# Patient Record
Sex: MALE | Race: WHITE | NOT HISPANIC OR LATINO | URBAN - METROPOLITAN AREA
[De-identification: names, ages, dates, MRNs, and addresses within clinical notes are randomized per-mention and may not be internally consistent; named-entity substitution may affect disease eponyms.]

---

## 2019-09-08 ENCOUNTER — EMERGENCY (EMERGENCY)
Facility: HOSPITAL | Age: 69
LOS: 1 days | Discharge: HOME | End: 2019-09-08
Attending: EMERGENCY MEDICINE | Admitting: EMERGENCY MEDICINE
Payer: MEDICARE

## 2019-09-08 VITALS
DIASTOLIC BLOOD PRESSURE: 65 MMHG | TEMPERATURE: 97 F | OXYGEN SATURATION: 100 % | RESPIRATION RATE: 16 BRPM | SYSTOLIC BLOOD PRESSURE: 140 MMHG | HEART RATE: 71 BPM

## 2019-09-08 VITALS
TEMPERATURE: 98 F | HEART RATE: 79 BPM | OXYGEN SATURATION: 100 % | DIASTOLIC BLOOD PRESSURE: 69 MMHG | RESPIRATION RATE: 16 BRPM | SYSTOLIC BLOOD PRESSURE: 138 MMHG

## 2019-09-08 LAB
ALBUMIN SERPL ELPH-MCNC: 4 G/DL — SIGNIFICANT CHANGE UP (ref 3.3–5)
ALP SERPL-CCNC: 57 U/L — SIGNIFICANT CHANGE UP (ref 40–120)
ALT FLD-CCNC: 20 U/L — SIGNIFICANT CHANGE UP (ref 4–41)
AMPHET UR-MCNC: NEGATIVE — SIGNIFICANT CHANGE UP
ANION GAP SERPL CALC-SCNC: 18 MMO/L — HIGH (ref 7–14)
APAP SERPL-MCNC: < 15 UG/ML — LOW (ref 15–25)
APPEARANCE UR: SIGNIFICANT CHANGE UP
AST SERPL-CCNC: 27 U/L — SIGNIFICANT CHANGE UP (ref 4–40)
BACTERIA # UR AUTO: SIGNIFICANT CHANGE UP
BARBITURATES UR SCN-MCNC: NEGATIVE — SIGNIFICANT CHANGE UP
BASOPHILS # BLD AUTO: 0.05 K/UL — SIGNIFICANT CHANGE UP (ref 0–0.2)
BASOPHILS NFR BLD AUTO: 1 % — SIGNIFICANT CHANGE UP (ref 0–2)
BENZODIAZ UR-MCNC: NEGATIVE — SIGNIFICANT CHANGE UP
BILIRUB SERPL-MCNC: 0.7 MG/DL — SIGNIFICANT CHANGE UP (ref 0.2–1.2)
BILIRUB UR-MCNC: NEGATIVE — SIGNIFICANT CHANGE UP
BLOOD UR QL VISUAL: SIGNIFICANT CHANGE UP
BUN SERPL-MCNC: 23 MG/DL — SIGNIFICANT CHANGE UP (ref 7–23)
CALCIUM SERPL-MCNC: 9.5 MG/DL — SIGNIFICANT CHANGE UP (ref 8.4–10.5)
CANNABINOIDS UR-MCNC: NEGATIVE — SIGNIFICANT CHANGE UP
CHLORIDE SERPL-SCNC: 109 MMOL/L — HIGH (ref 98–107)
CO2 SERPL-SCNC: 18 MMOL/L — LOW (ref 22–31)
COCAINE METAB.OTHER UR-MCNC: NEGATIVE — SIGNIFICANT CHANGE UP
COLOR SPEC: SIGNIFICANT CHANGE UP
CREAT SERPL-MCNC: 1.53 MG/DL — HIGH (ref 0.5–1.3)
EOSINOPHIL # BLD AUTO: 0.14 K/UL — SIGNIFICANT CHANGE UP (ref 0–0.5)
EOSINOPHIL NFR BLD AUTO: 2.7 % — SIGNIFICANT CHANGE UP (ref 0–6)
EPI CELLS # UR: SIGNIFICANT CHANGE UP
ETHANOL BLD-MCNC: < 10 MG/DL — SIGNIFICANT CHANGE UP
GLUCOSE SERPL-MCNC: 119 MG/DL — HIGH (ref 70–99)
GLUCOSE UR-MCNC: NEGATIVE — SIGNIFICANT CHANGE UP
HBA1C BLD-MCNC: 6.5 % — HIGH (ref 4–5.6)
HCT VFR BLD CALC: 32.6 % — LOW (ref 39–50)
HGB BLD-MCNC: 10.2 G/DL — LOW (ref 13–17)
IMM GRANULOCYTES NFR BLD AUTO: 0.6 % — SIGNIFICANT CHANGE UP (ref 0–1.5)
KETONES UR-MCNC: 10 — SIGNIFICANT CHANGE UP
LEUKOCYTE ESTERASE UR-ACNC: HIGH
LYMPHOCYTES # BLD AUTO: 1.01 K/UL — SIGNIFICANT CHANGE UP (ref 1–3.3)
LYMPHOCYTES # BLD AUTO: 19.6 % — SIGNIFICANT CHANGE UP (ref 13–44)
MCHC RBC-ENTMCNC: 28.1 PG — SIGNIFICANT CHANGE UP (ref 27–34)
MCHC RBC-ENTMCNC: 31.3 % — LOW (ref 32–36)
MCV RBC AUTO: 89.8 FL — SIGNIFICANT CHANGE UP (ref 80–100)
METHADONE UR-MCNC: NEGATIVE — SIGNIFICANT CHANGE UP
MONOCYTES # BLD AUTO: 0.67 K/UL — SIGNIFICANT CHANGE UP (ref 0–0.9)
MONOCYTES NFR BLD AUTO: 13 % — SIGNIFICANT CHANGE UP (ref 2–14)
NEUTROPHILS # BLD AUTO: 3.25 K/UL — SIGNIFICANT CHANGE UP (ref 1.8–7.4)
NEUTROPHILS NFR BLD AUTO: 63.1 % — SIGNIFICANT CHANGE UP (ref 43–77)
NITRITE UR-MCNC: NEGATIVE — SIGNIFICANT CHANGE UP
NRBC # FLD: 0 K/UL — SIGNIFICANT CHANGE UP (ref 0–0)
OPIATES UR-MCNC: NEGATIVE — SIGNIFICANT CHANGE UP
OXYCODONE UR-MCNC: NEGATIVE — SIGNIFICANT CHANGE UP
PCP UR-MCNC: NEGATIVE — SIGNIFICANT CHANGE UP
PH UR: 6.5 — SIGNIFICANT CHANGE UP (ref 5–8)
PLATELET # BLD AUTO: 208 K/UL — SIGNIFICANT CHANGE UP (ref 150–400)
PMV BLD: 9.9 FL — SIGNIFICANT CHANGE UP (ref 7–13)
POTASSIUM SERPL-MCNC: 4 MMOL/L — SIGNIFICANT CHANGE UP (ref 3.5–5.3)
POTASSIUM SERPL-SCNC: 4 MMOL/L — SIGNIFICANT CHANGE UP (ref 3.5–5.3)
PROT SERPL-MCNC: 6.7 G/DL — SIGNIFICANT CHANGE UP (ref 6–8.3)
PROT UR-MCNC: 200 — HIGH
RBC # BLD: 3.63 M/UL — LOW (ref 4.2–5.8)
RBC # FLD: 14.6 % — HIGH (ref 10.3–14.5)
RBC CASTS # UR COMP ASSIST: >50 — HIGH (ref 0–?)
SALICYLATES SERPL-MCNC: < 5 MG/DL — LOW (ref 15–30)
SODIUM SERPL-SCNC: 145 MMOL/L — SIGNIFICANT CHANGE UP (ref 135–145)
SP GR SPEC: 1.02 — SIGNIFICANT CHANGE UP (ref 1–1.04)
T3 SERPL-MCNC: 81.2 NG/DL — SIGNIFICANT CHANGE UP (ref 80–200)
T4 AB SER-ACNC: 6.94 UG/DL — SIGNIFICANT CHANGE UP (ref 5.1–13)
TSH SERPL-MCNC: 1.84 UIU/ML — SIGNIFICANT CHANGE UP (ref 0.27–4.2)
UROBILINOGEN FLD QL: NORMAL — SIGNIFICANT CHANGE UP
WBC # BLD: 5.15 K/UL — SIGNIFICANT CHANGE UP (ref 3.8–10.5)
WBC # FLD AUTO: 5.15 K/UL — SIGNIFICANT CHANGE UP (ref 3.8–10.5)
WBC UR QL: HIGH (ref 0–?)

## 2019-09-08 PROCEDURE — 99284 EMERGENCY DEPT VISIT MOD MDM: CPT

## 2019-09-08 RX ORDER — CEFTRIAXONE 500 MG/1
1000 INJECTION, POWDER, FOR SOLUTION INTRAMUSCULAR; INTRAVENOUS ONCE
Refills: 0 | Status: COMPLETED | OUTPATIENT
Start: 2019-09-08 | End: 2019-09-08

## 2019-09-08 RX ORDER — SODIUM CHLORIDE 9 MG/ML
1000 INJECTION INTRAMUSCULAR; INTRAVENOUS; SUBCUTANEOUS ONCE
Refills: 0 | Status: COMPLETED | OUTPATIENT
Start: 2019-09-08 | End: 2019-09-08

## 2019-09-08 RX ORDER — CEFTRIAXONE 500 MG/1
500 INJECTION, POWDER, FOR SOLUTION INTRAMUSCULAR; INTRAVENOUS ONCE
Refills: 0 | Status: DISCONTINUED | OUTPATIENT
Start: 2019-09-08 | End: 2019-09-08

## 2019-09-08 RX ORDER — CEPHALEXIN 500 MG
1 CAPSULE ORAL
Qty: 14 | Refills: 0
Start: 2019-09-08 | End: 2019-09-14

## 2019-09-08 RX ADMIN — SODIUM CHLORIDE 1000 MILLILITER(S): 9 INJECTION INTRAMUSCULAR; INTRAVENOUS; SUBCUTANEOUS at 11:27

## 2019-09-08 RX ADMIN — CEFTRIAXONE 100 MILLIGRAM(S): 500 INJECTION, POWDER, FOR SOLUTION INTRAMUSCULAR; INTRAVENOUS at 11:27

## 2019-09-08 NOTE — ED ADULT NURSE NOTE - OBJECTIVE STATEMENT
Received pt in room 16. Pt A&OX3, ambulatory. Pt brought in by EMS by [facility]. Pt reports feeling depressed for a long time since his wife passed away in 1984, worsening. Pt reports feeling depressed and having an "episode", pt states "I went for a walk barefoot and knew I was in trouble and felt disoriented, and needed emotional help so I went into [address] and they called 911". Pt w hx. HTN, HLD, BPH, CAD.  Denies any psych hx. Pt denies SI Hi hearing/Visual Hallucination Denies Alcohol/drug used. Pt appears stable at this time, pt tearful when spoken to about feeling depressed but otherwise in good spirits. Pt w leg beg to right thigh. As per pt uses leg bag due to BPH. Pt draining urine. Pt denies any other medical complaints. denies pain, sob, n/v/d, fever, cough, dizziness, headache. Respirations are equal and nonlabored, no respiratory distress noted. Pt stable at this time. awaiting further plan Received pt in room 16. Pt A&OX3, ambulatory. Pt brought in by EMS by [facility]. Pt reports feeling depressed for a long time since his wife passed away in 1984, worsening. Pt reports feeling depressed and having an "episode", pt states "I went for a walk barefoot and knew I was in trouble and felt disoriented, and needed emotional help so I went into [facility] and they called 911". Pt w hx. HTN, HLD, BPH, CAD.  Denies any psych hx. Pt denies SI Hi hearing/Visual Hallucination Denies Alcohol/drug used. Pt appears stable at this time, pt tearful when spoken to about feeling depressed but otherwise in good spirits. Pt w leg beg to right thigh. As per pt uses leg bag due to BPH. Pt draining urine. Pt denies any other medical complaints. denies pain, sob, n/v/d, fever, cough, dizziness, headache. Respirations are equal and nonlabored, no respiratory distress noted. 20 gauge IV placed in the left AC, labs sent. Pt stable at this time. awaiting further plan

## 2019-09-08 NOTE — ED PROVIDER NOTE - OBJECTIVE STATEMENT
PGY2/MD Chris. 70 yo M with DM on Metformin, sleeping disorder, BPH, p/w depressed mood. Pt's father was bipolar, pt feels he may have that too. Denies definitieve psychiatric diagnosis. Pt denies he is suicidal, no audible or visible hallucination or paranoidal thought. Feels more energy but admits to poor sleep, not satisfied with the sleep. Good appetite. Pt has frequent urination, visited Urology recently, and placed on Folley, x 1wk. Denies suprapubic fullness or urge. No fever, chest pain or sob.

## 2019-09-08 NOTE — ED PROVIDER NOTE - CLINICAL SUMMARY MEDICAL DECISION MAKING FREE TEXT BOX
PGY2/MD Chris. 68 yo M with DM, BPH, sleeping disorder, p/w depressed mood but no phychiatric emergency. will differentiate from organic cause, such as electrolyte, worsening sugar control, madelyn, or uti. Pt recently started on Folley, will irrigate and check whether it is functioning to r/o urinary retention, ultrasound.

## 2019-09-08 NOTE — ED PROVIDER NOTE - PHYSICAL EXAMINATION
PGY2/MD Chris.   VITALS: reviewed  GEN: No apparent distress, A & O x 4  HEAD/EYES: NC/AT, PERRL, EOMI, anicteric sclerae, no conjunctival pallor  ENT: mucus membranes moist, oropharynx WNL, trachea midline, no JVD, neck is supple  RESP: lungs CTA with equal breath sounds bilaterally, chest wall nontender and atraumatic  CV: heart with reg rhythm S1, S2, no murmur; distal pulses intact and symmetric bilaterally  ABDOMEN: normoactive bowel sounds, soft, nondistended, nontender  : no CVAT, +Folley in place with dried blood around the penis, yellow dark urine in the bag  MSK: extremities atraumatic and nontender, no edema, no asymmetry.  SKIN: warm, dry, no rash, no bruising, no cyanosis. color appropriate for ethnicity  NEURO: alert, mentating appropriately, no facial asymmetry.  PSYCH: Affect appropriate

## 2019-09-08 NOTE — PROVIDER CONTACT NOTE (OTHER) - ASSESSMENT
Medical team referred this case as pt has been medically cleared for discharge. Writer spoke with the pt in ED # 16 and pt requested 2 metro card to go home as he resides at [facility] . Pt stated he lives alone and just moved to new residence last Thursday and didn't sleep well for few days and pt feels fine now after coming to ED. Pt is alert, ambulatory and oriented X4. Writer provided the pt with 2 metro cards # [phone]. Medical team was made aware of this intervention.  No further SW intervention needed for this visit.

## 2019-09-08 NOTE — ED PROVIDER NOTE - NSFOLLOWUPINSTRUCTIONS_ED_ALL_ED_FT
You had a thorough evaluation including an exam, labs and imaging.  1. You were seen for urinary tract infection, with no signs of obstruction of the Folley catheter. A copy of your resulted labs, imaging, and findings have been provided to you.  2. Please complete antibiotics (Keflex twice /day, x 7days)  3. Call [facility], [phone], and establish appointment tomorrow.   4. Follow up with your primary care doctor within 48 hours, and see your Urologist tomorrow. Please call [phone] to make an appointment or with any questions you may have.  5. Return immediately to the emergency department for new, persistent, or worsening symptoms or signs. Return immediately to the emergency department if you develop suicidal idea, chest pain, shortness of breath, loss of consciousness, or any other new concerns.

## 2019-09-08 NOTE — ED PROVIDER NOTE - PROGRESS NOTE DETAILS
cho flushed by RN. draining yellow urine. will treat w IV abx, dc po abx,. Refer pt to crisis center. pt feels comfortable w plan. cho flushed by RN. draining yellow urine. will treat w IV abx, dc po abx,. Refer pt to crisis center. pt feels comfortable w plan. SW evaluating pt. PGY2/MD Chris. bedside ultrasound, residual bladder volume less than 50 mL, after irrigation, no significant blood clots in side the bladder. Folley is functioning well, positive uti, will start abx. pt has been given  of our crisis center, willing to see them tomorrow. Pt was overwelmed and found on the street, SW will evaluate him to safely go home. I have given the pt strict return and follow up precautions. The patient has been provided with a copy of all pertinent results. The patient has been informed of all concerning signs and symptoms to return to Emergency Department, the necessity to follow up with PMD/Clinic/follow up provided within 2-3 days was explained, and the patient reports understanding of above with capacity and insight. PGY2/MD Chris. bedside ultrasound, residual bladder volume less than 50 mL, after irrigation, no significant blood clots in side the bladder. Kyaw is functioning well, positive uti, will start abx. Mild elevated creatinine, no obstruction, may be his baseline but may be dehydration, treat with saline bolus. pt has been given  of our crisis center, willing to see them tomorrow. Pt was overwelmed and found on the street, SW will evaluate him to safely go home. I have given the pt strict return and follow up precautions. The patient has been provided with a copy of all pertinent results. The patient has been informed of all concerning signs and symptoms to return to Emergency Department, the necessity to follow up with PMD/Clinic/follow up provided within 2-3 days was explained, and the patient reports understanding of above with capacity and insight.

## 2019-09-08 NOTE — ED ADULT TRIAGE NOTE - CHIEF COMPLAINT QUOTE
Pt JUAN JOSE, NYQP, Ambulatory. reports feeling Depress for long time since his Wife passed away since 1984,and Worsening. Today wakes up and had a walk, Called PD claimed He feels Disoriented, and needed a Help for Emotional Trouble, EMS was called upon assessment, Pt is AOx3  at scene, Pt denies SI Hi hearing/Visual Hallucination Denies Alcohol/drug used. Never DX with Depression. PMHx HTN HLD BPH CAD

## 2019-09-08 NOTE — ED PROVIDER NOTE - ATTENDING CONTRIBUTION TO CARE
Attending Statement: I have personally seen and examined this patient. I have fully participated in the care of this patient. I have reviewed all pertinent clinical information, including history physical exam, plan and the Resident's note and agree except as noted Attending Statement: I have personally seen and examined this patient. I have fully participated in the care of this patient. I have reviewed all pertinent clinical information, including history physical exam, plan and the Resident's note and agree except as noted  70yo M hx of DM, BPH, anxiety BIBEMS co feeling anxious. pt states he moved a week ago, felt "overwhelmed with move and change" left apartment last night walked without shoes or wallet. bystander called EMS. pt feeling better now. endorse hx of anxiety no prior meds or hospitalization. no SI or HI. no auditory or visual hallucination. no ingestion. Denies ETOH or drug use. pt hx of BPH had a cho placed, he pulled out, re inserted by urology. No abdominal pain. no back pain. no hematuria. no frequency or urgency. not on abx.  not on AC.   Vital signs noted. sitting up, no distress. no sign of head/facial trauma. EOMI. mmm. normal S1-S2 No resp distress. able to speak in full and clear sentences. no wheeze, rales or stridor. soft nt abdomen. no cvat. +cho in place w a leg bag. dark concentrated urine in bag, dried blood in perinuem.   AO3 cooperative.  plan labs, ua, irrigate cho, change leg bag, re assess

## 2019-09-08 NOTE — ED PROVIDER NOTE - PATIENT PORTAL LINK FT
You can access the FollowMyHealth Patient Portal offered by [facility] by registering at the following website: [email or web address]. By joining [employer]’s FollowMyHealth portal, you will also be able to view your health information using other applications (apps) compatible with our system.

## 2019-09-08 NOTE — ED ADULT NURSE NOTE - NSIMPLEMENTINTERV_GEN_ALL_ED
Implemented All Universal Safety Interventions:  [place] to call system. Call bell, personal items and telephone within reach. Instruct patient to call for assistance. Room bathroom lighting operational. Non-slip footwear when patient is off stretcher. Physically safe environment: no spills, clutter or unnecessary equipment. Stretcher in lowest position, wheels locked, appropriate side rails in place.

## 2019-09-09 LAB — SPECIMEN SOURCE: SIGNIFICANT CHANGE UP

## 2019-09-10 ENCOUNTER — EMERGENCY (EMERGENCY)
Facility: HOSPITAL | Age: 69
LOS: 1 days | Discharge: HOME | End: 2019-09-10
Attending: EMERGENCY MEDICINE
Payer: MEDICARE

## 2019-09-10 VITALS
RESPIRATION RATE: 17 BRPM | OXYGEN SATURATION: 98 % | WEIGHT: 145.06 LBS | HEIGHT: 63 IN | TEMPERATURE: 98 F | SYSTOLIC BLOOD PRESSURE: 138 MMHG | HEART RATE: 66 BPM | DIASTOLIC BLOOD PRESSURE: 81 MMHG

## 2019-09-10 VITALS
HEART RATE: 64 BPM | DIASTOLIC BLOOD PRESSURE: 66 MMHG | TEMPERATURE: 98 F | OXYGEN SATURATION: 100 % | RESPIRATION RATE: 16 BRPM | SYSTOLIC BLOOD PRESSURE: 111 MMHG

## 2019-09-10 LAB
-  AMIKACIN: SIGNIFICANT CHANGE UP
-  AMPICILLIN/SULBACTAM: SIGNIFICANT CHANGE UP
-  AMPICILLIN: SIGNIFICANT CHANGE UP
-  AZTREONAM: SIGNIFICANT CHANGE UP
-  CEFAZOLIN: SIGNIFICANT CHANGE UP
-  CEFEPIME: SIGNIFICANT CHANGE UP
-  CEFOXITIN: SIGNIFICANT CHANGE UP
-  CEFTAZIDIME: SIGNIFICANT CHANGE UP
-  CEFTRIAXONE: SIGNIFICANT CHANGE UP
-  ERTAPENEM: SIGNIFICANT CHANGE UP
-  GENTAMICIN: SIGNIFICANT CHANGE UP
-  IMIPENEM: SIGNIFICANT CHANGE UP
-  LEVOFLOXACIN: SIGNIFICANT CHANGE UP
-  MEROPENEM: SIGNIFICANT CHANGE UP
-  NITROFURANTOIN: SIGNIFICANT CHANGE UP
-  PIPERACILLIN/TAZOBACTAM: SIGNIFICANT CHANGE UP
-  TIGECYCLINE: SIGNIFICANT CHANGE UP
-  TOBRAMYCIN: SIGNIFICANT CHANGE UP
-  TRIMETHOPRIM/SULFAMETHOXAZOLE: SIGNIFICANT CHANGE UP
ANION GAP SERPL CALC-SCNC: 12 MMOL/L — SIGNIFICANT CHANGE UP (ref 5–17)
APPEARANCE UR: CLEAR — SIGNIFICANT CHANGE UP
BACTERIA # UR AUTO: NEGATIVE — SIGNIFICANT CHANGE UP
BACTERIA UR CULT: SIGNIFICANT CHANGE UP
BILIRUB UR-MCNC: NEGATIVE — SIGNIFICANT CHANGE UP
BUN SERPL-MCNC: 26 MG/DL — HIGH (ref 7–23)
CALCIUM SERPL-MCNC: 9.9 MG/DL — SIGNIFICANT CHANGE UP (ref 8.4–10.5)
CHLORIDE SERPL-SCNC: 104 MMOL/L — SIGNIFICANT CHANGE UP (ref 96–108)
CO2 SERPL-SCNC: 19 MMOL/L — LOW (ref 22–31)
COLOR SPEC: SIGNIFICANT CHANGE UP
CREAT SERPL-MCNC: 2.12 MG/DL — HIGH (ref 0.5–1.3)
DIFF PNL FLD: ABNORMAL
EPI CELLS # UR: 0 /HPF — SIGNIFICANT CHANGE UP
GLUCOSE SERPL-MCNC: 169 MG/DL — HIGH (ref 70–99)
GLUCOSE UR QL: NEGATIVE — SIGNIFICANT CHANGE UP
HYALINE CASTS # UR AUTO: 1 /LPF — SIGNIFICANT CHANGE UP (ref 0–2)
KETONES UR-MCNC: NEGATIVE — SIGNIFICANT CHANGE UP
LEUKOCYTE ESTERASE UR-ACNC: ABNORMAL
METHOD TYPE: SIGNIFICANT CHANGE UP
NITRITE UR-MCNC: NEGATIVE — SIGNIFICANT CHANGE UP
ORGANISM # SPEC MICROSCOPIC CNT: SIGNIFICANT CHANGE UP
ORGANISM # SPEC MICROSCOPIC CNT: SIGNIFICANT CHANGE UP
PH UR: 6 — SIGNIFICANT CHANGE UP (ref 5–8)
POTASSIUM SERPL-MCNC: 3.8 MMOL/L — SIGNIFICANT CHANGE UP (ref 3.5–5.3)
POTASSIUM SERPL-SCNC: 3.8 MMOL/L — SIGNIFICANT CHANGE UP (ref 3.5–5.3)
PROT UR-MCNC: SIGNIFICANT CHANGE UP
RBC CASTS # UR COMP ASSIST: 4 /HPF — SIGNIFICANT CHANGE UP (ref 0–4)
SODIUM SERPL-SCNC: 135 MMOL/L — SIGNIFICANT CHANGE UP (ref 135–145)
SP GR SPEC: 1.01 — SIGNIFICANT CHANGE UP (ref 1.01–1.02)
UROBILINOGEN FLD QL: NEGATIVE — SIGNIFICANT CHANGE UP
WBC UR QL: 10 /HPF — HIGH (ref 0–5)

## 2019-09-10 PROCEDURE — 80048 BASIC METABOLIC PNL TOTAL CA: CPT

## 2019-09-10 PROCEDURE — 99284 EMERGENCY DEPT VISIT MOD MDM: CPT | Mod: GC

## 2019-09-10 PROCEDURE — 87086 URINE CULTURE/COLONY COUNT: CPT

## 2019-09-10 PROCEDURE — 99283 EMERGENCY DEPT VISIT LOW MDM: CPT | Mod: 25

## 2019-09-10 PROCEDURE — 81001 URINALYSIS AUTO W/SCOPE: CPT

## 2019-09-10 PROCEDURE — 51702 INSERT TEMP BLADDER CATH: CPT

## 2019-09-10 RX ORDER — CEPHALEXIN 500 MG
500 CAPSULE ORAL ONCE
Refills: 0 | Status: COMPLETED | OUTPATIENT
Start: 2019-09-10 | End: 2019-09-10

## 2019-09-10 RX ADMIN — Medication 500 MILLIGRAM(S): at 07:25

## 2019-09-10 NOTE — ED ADULT NURSE NOTE - NSIMPLEMENTINTERV_GEN_ALL_ED
Implemented All Universal Safety Interventions:  [place] to call system. Call bell, personal items and telephone within reach. Instruct patient to call for assistance. Room bathroom lighting operational. Non-slip footwear when patient is off stretcher. Physically safe environment: no spills, clutter or unnecessary equipment. Stretcher in lowest position, wheels locked, appropriate side rails in place.

## 2019-09-10 NOTE — ED PROVIDER NOTE - PHYSICAL EXAMINATION
General: well appearing, interactive, well nourished, no apparent distress, ncat  HEENT: EOMI, PERRLA, normal mucosa, normal oropharynx, no lesions on the lips or on oral mucosa, normal external ear  Neck: supple, no lymphadenopathy, full range of motion, no nuchal rigidity  CV: RRR, normal S1 and S2 with no murmur, capillary refill less than two seconds  Resp: non labored breathing  Abd: non-distended, soft, ttp in suprapubic region, negative murphys, negative mcburneys  : no CVA tenderness  MSK: full range of motion, no cyanosis, no edema, no clubbing, no immobility  Neuro: CN II-XII grossly intact, muscle strength 5/5 in all extremities, normal gait  Skin: no rashes, skin intact

## 2019-09-10 NOTE — ED ADULT NURSE NOTE - OBJECTIVE STATEMENT
68 yo m reporting to ED for urinary retention. PMH of DM, CAD, HTN, & HLD. Pt reporting difficulty urinating x2 days. Pt AAOx3, NAD, respirations spontaneous, non-labored, lungs clear bilat, abdomen soft nontender nondistended, strong peripheral pulses x 4, cap refill < 2 seconds, skin warm and dry. Pt denies headache, dizziness, chest pain, palpitations, cough, SOB, abdominal pain, n/v/d, fevers, chills, weakness at this time. Son at bedside. Bed locked & in lowest position. Safety maintained. Will continue to reassess. 68 yo m reporting to ED for urinary retention. PMH of DM, CAD, HTN, & HLD. Pt had cho catheter placedPt reporting difficulty urinating x2 days. Pt AAOx3, NAD, respirations spontaneous, non-labored, lungs clear bilat, abdomen soft nontender nondistended, strong peripheral pulses x 4, cap refill < 2 seconds, skin warm and dry. Pt denies headache, dizziness, chest pain, palpitations, cough, SOB, abdominal pain, n/v/d, fevers, chills, weakness at this time. Son at bedside. Bed locked & in lowest position. Safety maintained. Will continue to reassess. 68 yo m reporting to ED for urinary retention. PMH of DM, CAD, HTN, & HLD. Pt had cho catheter removed yesterday after having cho catheter with leg bad for 1 week for recent urinary retention. Pt was unable to urinate beginning on 18:00 on 9/10/19. Pt AAOx3, NAD, respirations spontaneous, non-labored, lungs clear bilat, abdomen soft nontender nondistended, strong peripheral pulses x 4, cap refill < 2 seconds, skin warm and dry. Pt denies headache, dizziness, chest pain, palpitations, cough, SOB, abdominal pain, n/v/d, fevers, chills, weakness at this time. Son at bedside. Bed locked & in lowest position. Safety maintained. Will continue to reassess.

## 2019-09-10 NOTE — ED PROVIDER NOTE - NS ED ROS FT
GENERAL: No fever or chills, //             EYES: no change in vision, //             HEENT: no trouble swallowing or speaking, //             CARDIAC: no chest pain, //              PULMONARY: no cough or SOB, //             GI: no abdominal pain, no nausea or no vomiting, no diarrhea or constipation, //             : urinary retention,  //            SKIN: no rashes,  //            NEURO: no headache,  //             MSK: No joint pain otherwise as HPI or negative. ~Kendrick Thayer DO PGY1

## 2019-09-10 NOTE — ED ADULT NURSE REASSESSMENT NOTE - NS ED NURSE REASSESS COMMENT FT1
Davies catheter inserted using sterile technique. Second RN present to confirm sterility. 500 cc drained. Bedside drainage to gravity. Stat lock in place. UA & UC sent to lab.

## 2019-09-10 NOTE — ED PROVIDER NOTE - PMH
CAD (coronary artery disease)    DM (diabetes mellitus), type 2    Dyslipidemia    HTN (hypertension)    Obesity

## 2019-09-10 NOTE — ED PROVIDER NOTE - CLINICAL SUMMARY MEDICAL DECISION MAKING FREE TEXT BOX
68 yo m w/ multiple medical comorbidities including bph, recently had indwelling cho x 1 week for urinary retention removed yesterday, pw repeated urinary retention. vss, afebrile, no f/c. no s/s infection. pt well appearing. pt attempted to urinate one more time however unable. cho placed by ED nursing w/ 500 cc urine obtained. will send ua/ucx to check for infection. will send BMP to monitor for ZIA. likely will require DC w/ cho bag and outpatient fu w/ urology.  - Kendrick Thayer D.O. PGY2

## 2019-09-10 NOTE — ED ADULT NURSE REASSESSMENT NOTE - NS ED NURSE REASSESS COMMENT FT1
Davies catheter switched to leg bag. Pt AAOx4, NAD, resting comfortably in bed with son at bedside. Pt denies headache, dizziness, chest pain, cough, SOB, abdominal pain, n/v/d, fevers, chills, weakness at this time. Pt verbalized understanding to follow-up with Urology and take antibiotics as prescribed. Pt discharged as per MD; pt ambulated independently out of ED.

## 2019-09-10 NOTE — ED PROVIDER NOTE - PROGRESS NOTE DETAILS
Patient reassessed, NAD, non-toxic appearing. results dw pt/family, questions answered. reports sx improvement. states will fu outpatient this morning w/ uro. madelyn noted, informed to drink fluids. pt being treated for + ua, informed to keep taking abx.   - Kendrick Thayer D.O. PGY2

## 2019-09-10 NOTE — ED PROVIDER NOTE - NSFOLLOWUPINSTRUCTIONS_ED_ALL_ED_FT
1) Please follow up with your Urologist in 24-48 hours  2) Seek immediate medical care for any new or returning symptoms including but not limited high fevers and/or chills, nausea/vomiting, severe pain 1) Please follow up with your Urologist in 24-48 hours  2) Seek immediate medical care for any new or returning symptoms including but not limited high fevers and/or chills, nausea/vomiting, severe pain  3) Continue taking antibiotics  4) Ensure to drink plenty of fluids

## 2019-09-10 NOTE — ED ADULT NURSE NOTE - CAS DISCH TRANSFER METHOD
Health Maintenance Due   Topic Date Due   • Hepatitis C Screening  04/25/2012   • Influenza Vaccine (1) 09/01/2018   • Depression Screening  02/27/2019       Patient is due for topics as listed above but is not proceeding with Immunization(s) Influenza and Hepatitis C Screening at this time. Education provided for Immunization(s) Influenza and Hepatitis C Screening      Over the last 2 weeks, how often have you been bothered by the following problems?          PHQ2 Score:  0  1. Little interest or pleasure in doing things?:  0  2. Feeling down, depressed, or hopeless?:  0                    Private car

## 2019-09-10 NOTE — ED PROVIDER NOTE - OBJECTIVE STATEMENT
70 yo m pmh BPH, NIDDM, hld, htn, cad, pw urinary retention. pt had cho removed yesterday after having bag in place x1 week for urinary retention by urologist. pt reported initial ability to urinate, however at 1800 pt was no longer able to urinate and has not been able to since then. pt reports suprapubic fullness and urinary urgency. was informed by urologist to visit ED if sx persisted. denies f/c.

## 2019-09-10 NOTE — ED ADULT NURSE NOTE - CHPI ED NUR SYMPTOMS NEG
no hematuria/no abdominal distension/no diarrhea/no nausea/no blood in stool/no chills/no dysuria/no fever

## 2019-09-10 NOTE — ED PROVIDER NOTE - PATIENT PORTAL LINK FT
You can access the FollowMyHealth Patient Portal offered by [facility] by registering at the following website: [email or web address]. By joining [employer]’s FollowMyHealth portal, you will also be able to view your health information using other applications (apps) compatible with our system.

## 2019-09-10 NOTE — ED PROVIDER NOTE - ATTENDING CONTRIBUTION TO CARE
69M presenting to the ED w/ urinary retention since yesterday, states that he had a cho removed yesterday after having a leg bag in place for 1 week for urinary retention by urologist. Patient states initially had the ability to urinate, but then could not. States that he has not urinated in over 18 hours. Reports suprapubic fullness and pain, non-radiating, denies exacerbating/alleviating factors. Pressure like. Infomed by his urologist to come to the ED for a cho placement.    ROS:  GEN: (-) fevers/chills, (-) weight loss, (-) fatigue/malaise  RESP: (-) shortness of breath, (-) cough, (-) sputum  CV: (-) chest pain, (-) palpitations,  GI: (-) nausea, (-) vomiting, (-) pain, (-) constipation, (-) diarrhea, (-) melena, (-) BRBPR  : (-) frequency/urgency, (-) hematuria, (-) dysuria, (-) incontinence, (-) discharge, (+) retention  EXT: (-) edema  NEURO: (-) paresthesias, (-) weakness, (-) headache, (-) dizziness, (-) syncope  MSK: no muscle pain     PMHx: BPH, NIDDM, HLD, HTN, CAD, Urinary retention  PSHx: coronary stent  Allergies: NKDA  SocHx: Denies ETOH/drugs/smoking    PHYSICAL EXAMINATION:  VITALS REVIEWED.  VS normal  GENERALIZED APPEARANCE:  Comfortable, no acute distress, ambulating without difficulty  SKIN:  Warm, dry, no cyanosis  HEAD:  No obvious scalp lesions  EYES:  Conjunctiva pink, no icterus  ENMT:  Mucus membranes moist, no stridor  NECK:  Supple, non-tender  CHEST AND RESPIRATORY:  Clear to auscultation B/L, good air entry B/L, equal chest expansion  HEART AND CARDIOVASCULAR:  Regular rate, no obvious murmur  ABDOMEN AND GI:  Soft, mild suprapubic distention.  No rebound, no guarding, no CVA-area tenderness  EXTREMITIES:  No deformity, edema, or calf tenderness  NEURO: AAOx3, gross motor and sensory intact

## 2019-09-11 LAB
CULTURE RESULTS: NO GROWTH — SIGNIFICANT CHANGE UP
SPECIMEN SOURCE: SIGNIFICANT CHANGE UP

## 2019-09-26 ENCOUNTER — FORM ENCOUNTER (OUTPATIENT)
Age: 69
End: 2019-09-26

## 2019-09-27 ENCOUNTER — APPOINTMENT (OUTPATIENT)
Dept: NEPHROLOGY | Facility: CLINIC | Age: 69
End: 2019-09-27
Payer: MEDICARE

## 2019-09-27 ENCOUNTER — APPOINTMENT (OUTPATIENT)
Dept: ULTRASOUND IMAGING | Facility: IMAGING CENTER | Age: 69
End: 2019-09-27
Payer: MEDICARE

## 2019-09-27 ENCOUNTER — LABORATORY RESULT (OUTPATIENT)
Age: 69
End: 2019-09-27

## 2019-09-27 ENCOUNTER — OUTPATIENT (OUTPATIENT)
Dept: OUTPATIENT SERVICES | Facility: HOSPITAL | Age: 69
LOS: 1 days | End: 2019-09-27
Payer: MEDICARE

## 2019-09-27 VITALS
SYSTOLIC BLOOD PRESSURE: 116 MMHG | WEIGHT: 149.91 LBS | HEART RATE: 55 BPM | BODY MASS INDEX: 28.3 KG/M2 | HEIGHT: 61 IN | OXYGEN SATURATION: 98 % | DIASTOLIC BLOOD PRESSURE: 69 MMHG

## 2019-09-27 DIAGNOSIS — Z00.00 ENCOUNTER FOR GENERAL ADULT MEDICAL EXAMINATION W/OUT ABNORMAL FINDINGS: ICD-10-CM

## 2019-09-27 DIAGNOSIS — Z00.00 ENCOUNTER FOR GENERAL ADULT MEDICAL EXAMINATION WITHOUT ABNORMAL FINDINGS: ICD-10-CM

## 2019-09-27 DIAGNOSIS — R97.20 ELEVATED PROSTATE, SPECIFIC ANTIGEN [PSA]: ICD-10-CM

## 2019-09-27 DIAGNOSIS — N17.9 ACUTE KIDNEY FAILURE, UNSPECIFIED: ICD-10-CM

## 2019-09-27 PROCEDURE — 76770 US EXAM ABDO BACK WALL COMP: CPT

## 2019-09-27 PROCEDURE — 76770 US EXAM ABDO BACK WALL COMP: CPT | Mod: 26

## 2019-09-27 PROCEDURE — 99204 OFFICE O/P NEW MOD 45 MIN: CPT

## 2019-09-27 RX ORDER — TAMSULOSIN HYDROCHLORIDE 0.4 MG/1
0.4 CAPSULE ORAL
Qty: 90 | Refills: 0 | Status: ACTIVE | COMMUNITY
Start: 2018-09-05

## 2019-09-27 RX ORDER — NIFEDIPINE 30 MG/1
30 TABLET, EXTENDED RELEASE ORAL DAILY
Qty: 90 | Refills: 3 | Status: ACTIVE | COMMUNITY
Start: 2019-09-27 | End: 1900-01-01

## 2019-09-27 RX ORDER — METOPROLOL TARTRATE 25 MG/1
25 TABLET, FILM COATED ORAL
Qty: 90 | Refills: 0 | Status: ACTIVE | COMMUNITY
Start: 2019-06-17

## 2019-09-27 NOTE — ASSESSMENT
[FreeTextEntry1] : Mr. MCKINNEY is a 69 year old male with ZIA on CKD\par \par ZIA- given findings on bedside sonogram likely related to distal obstruction and maybe cho not in place. d/w with Urology Dr Espinoza and plan to send the patient to ER for further evaluation after formal sonogram. Checking other causes as well- checking serological workup but less likely the cause.\par Agree to hold Metformin and hold losartan for now as well given ZIA\par Change to nifedipine for bp control given needs anti HTN\par \par CKD: likely from DMII and or chronic obstruction( has been normal for many years but started rising mostly in 2018). Once acute issues resolve, plan to check urinalysis with proteinuria.\par Hold losartan for now given ZIA\par \par f/u 6 weeks but will d/w with Urology and Medicine

## 2019-09-27 NOTE — HISTORY OF PRESENT ILLNESS
[FreeTextEntry1] : Mr. MCKINNEY is a 69 year old male with known CAD and HTN, DMII was recently noted by PMD to have a crt of 2.7mg/dl. He has had crt in normal range in 2010 and up to 2015 and started rising slowly in 2017 and most recent this year was 1.5-1.6 mg/dl range in 2019 March. He was then found to have elevated PSA and distal obstruction and required cho insertion by Urology 2 months ago. He had ZIA and that resolved with cho. He is getting MRI and postate bx to rule out cancer. Sept 9th, he was in ER as his foely was removed as crt malinda up and then re-inserted and crt improved. SInce then, no change in cho but crt up from 1.6 to 2.7mg/dl. He feels well and has not felt decreased urination. He is on losartan and metformin and his metfomin was stopped 2 days ago by PMD. He has no NSAID use. no other chronic disease. His bedside sonogram done here showed R sided mild hydro with cyst? and L side looked good and bladder - cho didn't look in place and was distended.

## 2019-09-27 NOTE — PHYSICAL EXAM
[General Appearance - Alert] : alert [General Appearance - In No Acute Distress] : in no acute distress [Outer Ear] : the ears and nose were normal in appearance [Oropharynx] : the oropharynx was normal [Neck Appearance] : the appearance of the neck was normal [Neck Cervical Mass (___cm)] : no neck mass was observed [Jugular Venous Distention Increased] : there was no jugular-venous distention [Thyroid Diffuse Enlargement] : the thyroid was not enlarged [Thyroid Nodule] : there were no palpable thyroid nodules [Auscultation Breath Sounds / Voice Sounds] : lungs were clear to auscultation bilaterally [Heart Rate And Rhythm] : heart rate was normal and rhythm regular [Heart Sounds] : normal S1 and S2 [Heart Sounds Gallop] : no gallops [Murmurs] : no murmurs [Heart Sounds Pericardial Friction Rub] : no pericardial rub [Bowel Sounds] : normal bowel sounds [Edema] : there was no peripheral edema [Abdomen Soft] : soft [Abdomen Tenderness] : non-tender [] : no hepato-splenomegaly [Abdomen Mass (___ Cm)] : no abdominal mass palpated [FreeTextEntry1] : POCUS exam showed bladder with cho not in place ( might be in prosthatic urethera), kidneys appear chronic thinning and R sided might have mild hydro [No CVA Tenderness] : no ~M costovertebral angle tenderness [Abnormal Walk] : normal gait [Oriented To Time, Place, And Person] : oriented to person, place, and time

## 2019-09-30 LAB
ALBUMIN SERPL ELPH-MCNC: 3.9 G/DL
ANION GAP SERPL CALC-SCNC: 12 MMOL/L
BASOPHILS # BLD AUTO: 0.07 K/UL
BASOPHILS NFR BLD AUTO: 1.1 %
BUN SERPL-MCNC: 22 MG/DL
C3 SERPL-MCNC: 98 MG/DL
C4 SERPL-MCNC: 24 MG/DL
CALCIUM SERPL-MCNC: 9.3 MG/DL
CALCIUM SERPL-MCNC: 9.3 MG/DL
CHLORIDE SERPL-SCNC: 110 MMOL/L
CO2 SERPL-SCNC: 20 MMOL/L
CREAT SERPL-MCNC: 1.38 MG/DL
DEPRECATED KAPPA LC FREE/LAMBDA SER: 1.35 RATIO
EOSINOPHIL # BLD AUTO: 0.27 K/UL
EOSINOPHIL NFR BLD AUTO: 4.2 %
ESTIMATED AVERAGE GLUCOSE: 134 MG/DL
GLUCOSE SERPL-MCNC: 122 MG/DL
HBA1C MFR BLD HPLC: 6.3 %
HCT VFR BLD CALC: 32.8 %
HGB BLD-MCNC: 9.9 G/DL
IMM GRANULOCYTES NFR BLD AUTO: 0.5 %
IRON SATN MFR SERPL: 28 %
IRON SERPL-MCNC: 76 UG/DL
KAPPA LC CSF-MCNC: 1.6 MG/DL
KAPPA LC SERPL-MCNC: 2.16 MG/DL
LYMPHOCYTES # BLD AUTO: 1.29 K/UL
LYMPHOCYTES NFR BLD AUTO: 20 %
M PROTEIN SPEC IFE-MCNC: NORMAL
MAGNESIUM SERPL-MCNC: 1.6 MG/DL
MAN DIFF?: NORMAL
MCHC RBC-ENTMCNC: 28.4 PG
MCHC RBC-ENTMCNC: 30.2 GM/DL
MCV RBC AUTO: 94 FL
MONOCYTES # BLD AUTO: 0.55 K/UL
MONOCYTES NFR BLD AUTO: 8.5 %
MPO AB + PR3 PNL SER: NORMAL
NEUTROPHILS # BLD AUTO: 4.24 K/UL
NEUTROPHILS NFR BLD AUTO: 65.7 %
PARATHYROID HORMONE INTACT: 30 PG/ML
PHOSPHATE SERPL-MCNC: 2.5 MG/DL
PLATELET # BLD AUTO: 233 K/UL
POTASSIUM SERPL-SCNC: 4.9 MMOL/L
RBC # BLD: 3.49 M/UL
RBC # FLD: 16 %
SODIUM SERPL-SCNC: 142 MMOL/L
TIBC SERPL-MCNC: 272 UG/DL
UIBC SERPL-MCNC: 196 UG/DL
WBC # FLD AUTO: 6.45 K/UL

## 2019-12-13 ENCOUNTER — EMERGENCY (EMERGENCY)
Facility: HOSPITAL | Age: 69
LOS: 1 days | Discharge: HOME | End: 2019-12-13
Attending: EMERGENCY MEDICINE
Payer: MEDICARE

## 2019-12-13 VITALS
RESPIRATION RATE: 16 BRPM | HEART RATE: 55 BPM | SYSTOLIC BLOOD PRESSURE: 146 MMHG | DIASTOLIC BLOOD PRESSURE: 82 MMHG | TEMPERATURE: 98 F | OXYGEN SATURATION: 96 %

## 2019-12-13 VITALS
DIASTOLIC BLOOD PRESSURE: 68 MMHG | OXYGEN SATURATION: 97 % | HEART RATE: 88 BPM | TEMPERATURE: 98 F | SYSTOLIC BLOOD PRESSURE: 194 MMHG | HEIGHT: 63 IN | RESPIRATION RATE: 18 BRPM | WEIGHT: 151.9 LBS

## 2019-12-13 LAB
APPEARANCE UR: CLEAR — SIGNIFICANT CHANGE UP
BACTERIA # UR AUTO: NEGATIVE — SIGNIFICANT CHANGE UP
BILIRUB UR-MCNC: NEGATIVE — SIGNIFICANT CHANGE UP
COLOR SPEC: SIGNIFICANT CHANGE UP
DIFF PNL FLD: ABNORMAL
EPI CELLS # UR: 0 /HPF — SIGNIFICANT CHANGE UP
GLUCOSE UR QL: NEGATIVE — SIGNIFICANT CHANGE UP
HYALINE CASTS # UR AUTO: 0 /LPF — SIGNIFICANT CHANGE UP (ref 0–2)
KETONES UR-MCNC: NEGATIVE — SIGNIFICANT CHANGE UP
LEUKOCYTE ESTERASE UR-ACNC: NEGATIVE — SIGNIFICANT CHANGE UP
NITRITE UR-MCNC: NEGATIVE — SIGNIFICANT CHANGE UP
PH UR: 6 — SIGNIFICANT CHANGE UP (ref 5–8)
PROT UR-MCNC: SIGNIFICANT CHANGE UP
RBC CASTS # UR COMP ASSIST: 73 /HPF — HIGH (ref 0–4)
SP GR SPEC: 1.02 — SIGNIFICANT CHANGE UP (ref 1.01–1.02)
UROBILINOGEN FLD QL: NEGATIVE — SIGNIFICANT CHANGE UP
WBC UR QL: 3 /HPF — SIGNIFICANT CHANGE UP (ref 0–5)

## 2019-12-13 PROCEDURE — 81001 URINALYSIS AUTO W/SCOPE: CPT

## 2019-12-13 PROCEDURE — 51798 US URINE CAPACITY MEASURE: CPT

## 2019-12-13 PROCEDURE — 99283 EMERGENCY DEPT VISIT LOW MDM: CPT | Mod: GC

## 2019-12-13 PROCEDURE — 99284 EMERGENCY DEPT VISIT MOD MDM: CPT | Mod: 25

## 2019-12-13 PROCEDURE — 87086 URINE CULTURE/COLONY COUNT: CPT

## 2019-12-13 NOTE — ED PROVIDER NOTE - NSFOLLOWUPINSTRUCTIONS_ED_ALL_ED_FT
Please return immediately if you develop any fevers, if the cho is not draining, any vomiting or further concerns  It is very important that you follow up with the urologist   Please continue the ciprofloxacin as prescribed.   Your urine culture was sent if it comes back positive you will receive a phone call

## 2019-12-13 NOTE — ED PROVIDER NOTE - OBJECTIVE STATEMENT
Attending Dionna Farooq: 68 y/o male s/p recent prostate biopsy with dr mcclain 2 days ago with cho in place presenting with no cho output. pt states feels increased fullness to the bladder area. last output in cho this am. no fevers or chills. is on cipro after the procedure. no fevers or chills. no rectal pain. not on blood thinners. denies any trauma to the cho. was working after the procedure

## 2019-12-13 NOTE — ED PROVIDER NOTE - PATIENT PORTAL LINK FT
You can access the FollowMyHealth Patient Portal offered by [facility] by registering at the following website: [email or web address]. By joining [employer]’s FollowMyHealth portal, you will also be able to view your health information using other applications (apps) compatible with our system.

## 2019-12-13 NOTE — ED PROVIDER NOTE - PHYSICAL EXAMINATION
Attending Dionna Farooq: Gen: NAD, heent: atrauamtic, eomi, perrla, mmm, op pink, uvula midline, neck; nttp, no nuchal rigidity, chest: nttp, no crepitus, cv: rrr, no murmurs, lungs: ctab, abd: soft, ttp suprapubic area, no peritoneal signs, +BS, no guarding, ext: wwp, neg homans, skin: no rash, neuro: awake and alert, following commands, speech clear, sensation and strength intact, no focal deficits

## 2019-12-13 NOTE — ED PROVIDER NOTE - PROGRESS NOTE DETAILS
Attending Dionna Farooq: attempted to flush cho and advance, met with resistance. when balloon deflated did have small amount of bleeding, urology consulted Attending Dionna Farooq: pt seen by urology who replaced cho. draining clear urine. afebrile. currently on cipro. d/w urology will follow up this week. close return precautions

## 2019-12-13 NOTE — ED PROVIDER NOTE - CLINICAL SUMMARY MEDICAL DECISION MAKING FREE TEXT BOX
Attending Dionna Farooq: 68 y/o male s/p recent prostate procedure presenting with cho not functioning. pocus shows evidence of urinary retention. concern for cho migration. less likely false track as was functioning after procedure. pt is afebrile. no tenesmus and symptoms atypical for prostatitis. will have urology evaluate, replace cho and re-eval. pt is on cipro

## 2019-12-13 NOTE — ED ADULT NURSE NOTE - SUICIDE SCREENING QUESTION 3
Post-Care Instructions: I reviewed with the patient in detail post-care instructions. Patient is to wear sunprotection, and avoid picking at any of the treated lesions. Pt may apply Vaseline to crusted or scabbing areas.
Price (Use Numbers Only, No Special Characters Or $): 200
Anesthesia Volume In Cc: 0.5
Detail Level: Detailed
Consent: The patient's consent was obtained including but not limited to risks of crusting, scabbing, blistering, scarring, darker or lighter pigmentary change, recurrence, incomplete removal and infection.
No

## 2019-12-13 NOTE — ED PROVIDER NOTE - ATTENDING CONTRIBUTION TO CARE
Attending MD Dionna Farooq:   I personally have seen and examined this patient.  Physician assistant note reviewed and agree on plan of care and except where noted.  See HPI, PE, and MDM for details.

## 2019-12-13 NOTE — ED ADULT NURSE NOTE - NSIMPLEMENTINTERV_GEN_ALL_ED
Implemented All Universal Safety Interventions:  [place] to call system. Call bell, personal items and telephone within reach. Instruct patient to call for assistance. Room bathroom lighting operational. Non-slip footwear when patient is off stretcher. Physically safe environment: no spills, clutter or unnecessary equipment. Stretcher in lowest position, wheels locked, appropriate side rails in place.

## 2019-12-13 NOTE — ED ADULT NURSE NOTE - OBJECTIVE STATEMENT
69 y.o M A&Ox3 with PMH of obesity, HTN, dyslipidemia, DM, CAD, presents to the ED c.o "urinary Davies catheter not draining." Pt. reports he had prostate biopsy done two does ago out patient, arrives with Davies in place c/o urinary retention since this AM. Pt. reports Davies catheter was draining without difficulty after procedure up until this AM. Reports suprapubic pain at this time. MD performed ultrasound at bedside and states Davies is not in bladder. When balloon deflated, bright red blood with clots drained. MD made aware - states to remove Davies and urology paged. Pt. pending urology at this time. Denies fevers, chills, dizziness, rectal pain, CP, SOB. Denies any unknown trauma to Davies. Safety and comfort provided. Family at bedside.

## 2019-12-14 LAB
CULTURE RESULTS: NO GROWTH — SIGNIFICANT CHANGE UP
SPECIMEN SOURCE: SIGNIFICANT CHANGE UP

## 2019-12-24 ENCOUNTER — EMERGENCY (EMERGENCY)
Facility: HOSPITAL | Age: 69
LOS: 1 days | Discharge: HOME | End: 2019-12-24
Attending: EMERGENCY MEDICINE
Payer: MEDICARE

## 2019-12-24 VITALS
SYSTOLIC BLOOD PRESSURE: 167 MMHG | RESPIRATION RATE: 18 BRPM | DIASTOLIC BLOOD PRESSURE: 81 MMHG | HEART RATE: 81 BPM | TEMPERATURE: 99 F | WEIGHT: 145.06 LBS | OXYGEN SATURATION: 98 % | HEIGHT: 63 IN

## 2019-12-24 VITALS
SYSTOLIC BLOOD PRESSURE: 141 MMHG | RESPIRATION RATE: 16 BRPM | DIASTOLIC BLOOD PRESSURE: 70 MMHG | OXYGEN SATURATION: 99 % | HEART RATE: 65 BPM | TEMPERATURE: 98 F

## 2019-12-24 PROCEDURE — 51702 INSERT TEMP BLADDER CATH: CPT

## 2019-12-24 PROCEDURE — 99283 EMERGENCY DEPT VISIT LOW MDM: CPT | Mod: GC

## 2019-12-24 PROCEDURE — 99283 EMERGENCY DEPT VISIT LOW MDM: CPT | Mod: 25

## 2019-12-24 NOTE — ED PROVIDER NOTE - PATIENT PORTAL LINK FT
You can access the FollowMyHealth Patient Portal offered by [facility] by registering at the following website: [email or web address]. By joining [employer]’s FollowMyHealth portal, you will also be able to view your health information using other applications (apps) compatible with our system.

## 2019-12-24 NOTE — ED ADULT NURSE NOTE - OBJECTIVE STATEMENT
Pt with enlarged prostate came in c/o the absence of urine output from the cho bag. No distress. Breathing easy and non labored. Ambulatory. Pt is not diaphoretic. The cho was last changed 10 days ago.

## 2019-12-24 NOTE — ED PROVIDER NOTE - OBJECTIVE STATEMENT
68 y/o M with pmhx of obesity, enlarged prostate, HTN, dyslipidemia, T2DM, and CAD and pshx of coronary stent presents to the ED c/o cho not draining since this morning. Pt was seen here in this hospital 10 days ago for same. Will be seeing Dr. Espinoza - Urology next week regarding further treatment after having the biopsy results. Denies fevers, chills, blood in the Cho.

## 2019-12-24 NOTE — ED PROVIDER NOTE - CARE PROVIDERS DIRECT ADDRESSES
[email or web address]

## 2019-12-24 NOTE — ED ADULT NURSE REASSESSMENT NOTE - NS ED NURSE REASSESS COMMENT FT1
Received report from JANEEN ibanez. Safety checked. No c/o pain or discomfort at this time. Comfort care provided. Pt encouraged to call for assist when needed. pending dc

## 2019-12-24 NOTE — ED PROVIDER NOTE - NSFOLLOWUPINSTRUCTIONS_ED_ALL_ED_FT
You were seen in the Emergency Department (ED) for urinary retention. Please keep your Davies catheter.     Please follow up with your primary care doctor and Dr. Espinoza in the next 72 hours.    Please return to the ED if you experience any new or concerning symptoms, such as: worsening urinary retention, fever, chills.     Thank you for choosing a [facility] ED.

## 2019-12-24 NOTE — ED PROVIDER NOTE - CARE PROVIDER_API CALL
Carlin Espinoza)  Urology  [address], [address]  [place] [address]  Phone: [phone]  Fax: [phone]  Follow Up Time: 4-6 Days

## 2019-12-24 NOTE — ED PROVIDER NOTE - CLINICAL SUMMARY MEDICAL DECISION MAKING FREE TEXT BOX
Janeen Madrigal): Janeen Madrigal): 70 y/o M with possible history of prostatic cancer awaiting biopsy results which was done 10 days ago. Came in with a clogged Cho. No fevers, chills, flank pain, no blood in the cho. Looks good. Replaced cho with a 800cc urine. Discharged with follow up with Dr. Espinoza.

## 2019-12-25 ENCOUNTER — EMERGENCY (EMERGENCY)
Facility: HOSPITAL | Age: 69
LOS: 1 days | Discharge: HOME | End: 2019-12-25
Attending: EMERGENCY MEDICINE
Payer: MEDICARE

## 2019-12-25 VITALS
RESPIRATION RATE: 18 BRPM | SYSTOLIC BLOOD PRESSURE: 135 MMHG | WEIGHT: 145.06 LBS | TEMPERATURE: 98 F | HEIGHT: 63 IN | HEART RATE: 83 BPM | OXYGEN SATURATION: 99 % | DIASTOLIC BLOOD PRESSURE: 75 MMHG

## 2019-12-25 PROCEDURE — 99283 EMERGENCY DEPT VISIT LOW MDM: CPT

## 2019-12-25 PROCEDURE — 99283 EMERGENCY DEPT VISIT LOW MDM: CPT | Mod: GC

## 2019-12-25 PROCEDURE — 51798 US URINE CAPACITY MEASURE: CPT

## 2019-12-25 PROCEDURE — 87086 URINE CULTURE/COLONY COUNT: CPT

## 2019-12-25 NOTE — ED PROVIDER NOTE - PATIENT PORTAL LINK FT
You can access the FollowMyHealth Patient Portal offered by [facility] by registering at the following website: [email or web address]. By joining [email or web address]’s FollowMyHealth portal, you will also be able to view your health information using other applications (apps) compatible with our system.

## 2019-12-25 NOTE — ED PROVIDER NOTE - ATTENDING CONTRIBUTION TO CARE
Attending MD Dionna Farooq:  I personally have seen and examined this patient.  Resident note reviewed and agree on plan of care and except where noted.  See HPI, PE, and MDM for details.

## 2019-12-25 NOTE — ED PROVIDER NOTE - PHYSICAL EXAMINATION
Attending Dionna Farooq: Gen: NAD, heent: atrauamtic, eomi, perrla, mmm, op pink, uvula midline, neck; nttp, no nuchal rigidity, chest: nttp, no crepitus, cv: rrr, no murmurs, lungs: ctab, abd: soft, nontender, nondistended, no peritoneal signs, +BS, no guarding, ext: wwp, neg homans, skin: no rash, neuro: awake and alert, following commands, speech clear, sensation and strength intact, no focal deficits

## 2019-12-25 NOTE — ED PROVIDER NOTE - CARE PROVIDERS DIRECT ADDRESSES
[email or web address]

## 2019-12-25 NOTE — ED PROVIDER NOTE - CARE PROVIDER_API CALL
Carlin Espinoza)  Urology  [address], [address]  [place] [address]  Phone: [phone]  Fax: [phone]  Follow Up Time:

## 2019-12-25 NOTE — ED PROVIDER NOTE - NSFOLLOWUPINSTRUCTIONS_ED_ALL_ED_FT
Please return immediately if you develop any fevers, chills, or back pain.  Please return if the cho stops draining or you develop any lower abdominal pain  Please follow up with your urologist

## 2019-12-25 NOTE — ED PROVIDER NOTE - PROGRESS NOTE DETAILS
Attending Dionna Farooq: pocus shows no evidence of cho balloon in the bladder. will deflate balloon and try to advance Catheter advanced and cho draining well.  UOP total 450mL.  Urine culture sent.

## 2019-12-25 NOTE — ED PROVIDER NOTE - CLINICAL SUMMARY MEDICAL DECISION MAKING FREE TEXT BOX
Attending Dionna Farooq: 68 y/o male presenting with cho not draining. pocus shows no evidence of cho in bladder, pt denies pulling on cho or traumal. afebrile in the ed. will deflate and try to reposition cho. no fevers or chills to suggest infection at this time. will re-eval Attending Dionna Farooq: 70 y/o male presenting with cho not draining. pocus shows no evidence of cho in bladder, pt denies pulling on cho or trauma. afebrile in the ed. will deflate and try to reposition cho. no fevers or chills to suggest infection at this time. will re-eval

## 2019-12-25 NOTE — ED PROVIDER NOTE - OBJECTIVE STATEMENT
Attending Dionna Farooq: 70 y/o male h/o enlarged prostate, cardiac disease on aspirin presenting with concern for cho not working. pt states since this am cho not draining. was seen in the ed yesterday and new cho placed. was draining after he left. no fevers or chills. pt not currently on antibiotic. reports increased pain to lower abdomen. no back pain. follows with dr mcclain

## 2019-12-26 LAB
CULTURE RESULTS: NO GROWTH — SIGNIFICANT CHANGE UP
SPECIMEN SOURCE: SIGNIFICANT CHANGE UP

## 2020-03-18 RX ORDER — AZTREONAM 2 G
2 VIAL (EA) INJECTION
Qty: 0 | Refills: 0 | DISCHARGE
Start: 2020-03-18

## 2020-03-20 ENCOUNTER — INPATIENT (INPATIENT)
Facility: HOSPITAL | Age: 70
LOS: 2 days | Discharge: HOME | DRG: 728 | End: 2020-03-23
Attending: INTERNAL MEDICINE | Admitting: INTERNAL MEDICINE
Payer: MEDICARE

## 2020-03-20 VITALS
TEMPERATURE: 99 F | OXYGEN SATURATION: 96 % | DIASTOLIC BLOOD PRESSURE: 80 MMHG | RESPIRATION RATE: 19 BRPM | HEART RATE: 93 BPM | WEIGHT: 175.05 LBS | HEIGHT: 64 IN | SYSTOLIC BLOOD PRESSURE: 133 MMHG

## 2020-03-20 DIAGNOSIS — N45.3 EPIDIDYMO-ORCHITIS: ICD-10-CM

## 2020-03-20 DIAGNOSIS — Z02.9 ENCOUNTER FOR ADMINISTRATIVE EXAMINATIONS, UNSPECIFIED: ICD-10-CM

## 2020-03-20 DIAGNOSIS — I10 ESSENTIAL (PRIMARY) HYPERTENSION: ICD-10-CM

## 2020-03-20 DIAGNOSIS — E11.9 TYPE 2 DIABETES MELLITUS WITHOUT COMPLICATIONS: ICD-10-CM

## 2020-03-20 DIAGNOSIS — Z29.9 ENCOUNTER FOR PROPHYLACTIC MEASURES, UNSPECIFIED: ICD-10-CM

## 2020-03-20 DIAGNOSIS — E78.5 HYPERLIPIDEMIA, UNSPECIFIED: ICD-10-CM

## 2020-03-20 DIAGNOSIS — N17.9 ACUTE KIDNEY FAILURE, UNSPECIFIED: ICD-10-CM

## 2020-03-20 DIAGNOSIS — I25.10 ATHEROSCLEROTIC HEART DISEASE OF NATIVE CORONARY ARTERY WITHOUT ANGINA PECTORIS: ICD-10-CM

## 2020-03-20 LAB
ALBUMIN SERPL ELPH-MCNC: 4 G/DL — SIGNIFICANT CHANGE UP (ref 3.3–5)
ALP SERPL-CCNC: 89 U/L — SIGNIFICANT CHANGE UP (ref 40–120)
ALT FLD-CCNC: 10 U/L — SIGNIFICANT CHANGE UP (ref 10–45)
ANION GAP SERPL CALC-SCNC: 16 MMOL/L — SIGNIFICANT CHANGE UP (ref 5–17)
APPEARANCE UR: ABNORMAL
APTT BLD: 29.8 SEC — SIGNIFICANT CHANGE UP (ref 27.5–36.3)
AST SERPL-CCNC: 13 U/L — SIGNIFICANT CHANGE UP (ref 10–40)
BASOPHILS # BLD AUTO: 0.07 K/UL — SIGNIFICANT CHANGE UP (ref 0–0.2)
BASOPHILS NFR BLD AUTO: 0.5 % — SIGNIFICANT CHANGE UP (ref 0–2)
BILIRUB SERPL-MCNC: 0.4 MG/DL — SIGNIFICANT CHANGE UP (ref 0.2–1.2)
BILIRUB UR-MCNC: NEGATIVE — SIGNIFICANT CHANGE UP
BLD GP AB SCN SERPL QL: NEGATIVE — SIGNIFICANT CHANGE UP
BUN SERPL-MCNC: 33 MG/DL — HIGH (ref 7–23)
CALCIUM SERPL-MCNC: 10.3 MG/DL — SIGNIFICANT CHANGE UP (ref 8.4–10.5)
CHLORIDE SERPL-SCNC: 102 MMOL/L — SIGNIFICANT CHANGE UP (ref 96–108)
CO2 SERPL-SCNC: 19 MMOL/L — LOW (ref 22–31)
COLOR SPEC: SIGNIFICANT CHANGE UP
CREAT SERPL-MCNC: 1.94 MG/DL — HIGH (ref 0.5–1.3)
CRP SERPL-MCNC: 8.3 MG/DL — HIGH (ref 0–0.4)
DIFF PNL FLD: ABNORMAL
EOSINOPHIL # BLD AUTO: 0.06 K/UL — SIGNIFICANT CHANGE UP (ref 0–0.5)
EOSINOPHIL NFR BLD AUTO: 0.4 % — SIGNIFICANT CHANGE UP (ref 0–6)
ERYTHROCYTE [SEDIMENTATION RATE] IN BLOOD: 50 MM/HR — HIGH (ref 0–20)
GLUCOSE SERPL-MCNC: 203 MG/DL — HIGH (ref 70–99)
GLUCOSE UR QL: NEGATIVE — SIGNIFICANT CHANGE UP
HCT VFR BLD CALC: 35.1 % — LOW (ref 39–50)
HGB BLD-MCNC: 11.6 G/DL — LOW (ref 13–17)
IMM GRANULOCYTES NFR BLD AUTO: 0.6 % — SIGNIFICANT CHANGE UP (ref 0–1.5)
INR BLD: 1.2 RATIO — HIGH (ref 0.88–1.16)
KETONES UR-MCNC: NEGATIVE — SIGNIFICANT CHANGE UP
LEUKOCYTE ESTERASE UR-ACNC: ABNORMAL
LYMPHOCYTES # BLD AUTO: 0.63 K/UL — LOW (ref 1–3.3)
LYMPHOCYTES # BLD AUTO: 4.3 % — LOW (ref 13–44)
MCHC RBC-ENTMCNC: 28 PG — SIGNIFICANT CHANGE UP (ref 27–34)
MCHC RBC-ENTMCNC: 33 GM/DL — SIGNIFICANT CHANGE UP (ref 32–36)
MCV RBC AUTO: 84.6 FL — SIGNIFICANT CHANGE UP (ref 80–100)
MONOCYTES # BLD AUTO: 1.2 K/UL — HIGH (ref 0–0.9)
MONOCYTES NFR BLD AUTO: 8.2 % — SIGNIFICANT CHANGE UP (ref 2–14)
NEUTROPHILS # BLD AUTO: 12.52 K/UL — HIGH (ref 1.8–7.4)
NEUTROPHILS NFR BLD AUTO: 86 % — HIGH (ref 43–77)
NITRITE UR-MCNC: NEGATIVE — SIGNIFICANT CHANGE UP
NRBC # BLD: 0 /100 WBCS — SIGNIFICANT CHANGE UP (ref 0–0)
PH UR: 6 — SIGNIFICANT CHANGE UP (ref 5–8)
PLATELET # BLD AUTO: 224 K/UL — SIGNIFICANT CHANGE UP (ref 150–400)
POTASSIUM SERPL-MCNC: 4 MMOL/L — SIGNIFICANT CHANGE UP (ref 3.5–5.3)
POTASSIUM SERPL-SCNC: 4 MMOL/L — SIGNIFICANT CHANGE UP (ref 3.5–5.3)
PROT SERPL-MCNC: 7.1 G/DL — SIGNIFICANT CHANGE UP (ref 6–8.3)
PROT UR-MCNC: ABNORMAL
PROTHROM AB SERPL-ACNC: 13.9 SEC — HIGH (ref 10–12.9)
RBC # BLD: 4.15 M/UL — LOW (ref 4.2–5.8)
RBC # FLD: 15.1 % — HIGH (ref 10.3–14.5)
RH IG SCN BLD-IMP: POSITIVE — SIGNIFICANT CHANGE UP
SODIUM SERPL-SCNC: 137 MMOL/L — SIGNIFICANT CHANGE UP (ref 135–145)
SP GR SPEC: 1.02 — SIGNIFICANT CHANGE UP (ref 1.01–1.02)
UROBILINOGEN FLD QL: NEGATIVE — SIGNIFICANT CHANGE UP
WBC # BLD: 14.57 K/UL — HIGH (ref 3.8–10.5)
WBC # FLD AUTO: 14.57 K/UL — HIGH (ref 3.8–10.5)

## 2020-03-20 PROCEDURE — 72193 CT PELVIS W/DYE: CPT | Mod: 26

## 2020-03-20 PROCEDURE — 99223 1ST HOSP IP/OBS HIGH 75: CPT

## 2020-03-20 PROCEDURE — 93975 VASCULAR STUDY: CPT | Mod: 26

## 2020-03-20 PROCEDURE — 76870 US EXAM SCROTUM: CPT | Mod: 26

## 2020-03-20 PROCEDURE — 99285 EMERGENCY DEPT VISIT HI MDM: CPT | Mod: GC

## 2020-03-20 RX ORDER — VANCOMYCIN HCL 1 G
1000 VIAL (EA) INTRAVENOUS ONCE
Refills: 0 | Status: COMPLETED | OUTPATIENT
Start: 2020-03-20 | End: 2020-03-20

## 2020-03-20 RX ORDER — PIPERACILLIN AND TAZOBACTAM 4; .5 G/20ML; G/20ML
3.38 INJECTION, POWDER, LYOPHILIZED, FOR SOLUTION INTRAVENOUS ONCE
Refills: 0 | Status: COMPLETED | OUTPATIENT
Start: 2020-03-20 | End: 2020-03-20

## 2020-03-20 RX ORDER — OMEGA-3 ACID ETHYL ESTERS 1 G
2 CAPSULE ORAL
Refills: 0 | Status: DISCONTINUED | OUTPATIENT
Start: 2020-03-20 | End: 2020-03-23

## 2020-03-20 RX ORDER — ACETAMINOPHEN 500 MG
650 TABLET ORAL EVERY 6 HOURS
Refills: 0 | Status: DISCONTINUED | OUTPATIENT
Start: 2020-03-20 | End: 2020-03-23

## 2020-03-20 RX ORDER — METOPROLOL TARTRATE 50 MG
25 TABLET ORAL DAILY
Refills: 0 | Status: DISCONTINUED | OUTPATIENT
Start: 2020-03-21 | End: 2020-03-23

## 2020-03-20 RX ORDER — ATORVASTATIN CALCIUM 80 MG/1
80 TABLET, FILM COATED ORAL AT BEDTIME
Refills: 0 | Status: DISCONTINUED | OUTPATIENT
Start: 2020-03-20 | End: 2020-03-23

## 2020-03-20 RX ORDER — ASPIRIN/CALCIUM CARB/MAGNESIUM 324 MG
81 TABLET ORAL DAILY
Refills: 0 | Status: DISCONTINUED | OUTPATIENT
Start: 2020-03-20 | End: 2020-03-23

## 2020-03-20 RX ORDER — PIPERACILLIN AND TAZOBACTAM 4; .5 G/20ML; G/20ML
3.38 INJECTION, POWDER, LYOPHILIZED, FOR SOLUTION INTRAVENOUS EVERY 8 HOURS
Refills: 0 | Status: DISCONTINUED | OUTPATIENT
Start: 2020-03-20 | End: 2020-03-23

## 2020-03-20 RX ORDER — LAMOTRIGINE 25 MG/1
25 TABLET, ORALLY DISINTEGRATING ORAL DAILY
Refills: 0 | Status: DISCONTINUED | OUTPATIENT
Start: 2020-03-20 | End: 2020-03-23

## 2020-03-20 RX ORDER — NIFEDIPINE 30 MG
30 TABLET, EXTENDED RELEASE 24 HR ORAL DAILY
Refills: 0 | Status: DISCONTINUED | OUTPATIENT
Start: 2020-03-21 | End: 2020-03-23

## 2020-03-20 RX ORDER — TAMSULOSIN HYDROCHLORIDE 0.4 MG/1
0.4 CAPSULE ORAL AT BEDTIME
Refills: 0 | Status: DISCONTINUED | OUTPATIENT
Start: 2020-03-20 | End: 2020-03-23

## 2020-03-20 RX ORDER — HEPARIN SODIUM 5000 [USP'U]/ML
5000 INJECTION INTRAVENOUS; SUBCUTANEOUS EVERY 8 HOURS
Refills: 0 | Status: DISCONTINUED | OUTPATIENT
Start: 2020-03-20 | End: 2020-03-23

## 2020-03-20 RX ORDER — SODIUM CHLORIDE 9 MG/ML
1000 INJECTION INTRAMUSCULAR; INTRAVENOUS; SUBCUTANEOUS ONCE
Refills: 0 | Status: COMPLETED | OUTPATIENT
Start: 2020-03-20 | End: 2020-03-20

## 2020-03-20 RX ORDER — CHOLECALCIFEROL (VITAMIN D3) 125 MCG
1000 CAPSULE ORAL DAILY
Refills: 0 | Status: DISCONTINUED | OUTPATIENT
Start: 2020-03-20 | End: 2020-03-23

## 2020-03-20 RX ADMIN — PIPERACILLIN AND TAZOBACTAM 25 GRAM(S): 4; .5 INJECTION, POWDER, LYOPHILIZED, FOR SOLUTION INTRAVENOUS at 23:44

## 2020-03-20 RX ADMIN — PIPERACILLIN AND TAZOBACTAM 200 GRAM(S): 4; .5 INJECTION, POWDER, LYOPHILIZED, FOR SOLUTION INTRAVENOUS at 15:13

## 2020-03-20 RX ADMIN — ATORVASTATIN CALCIUM 80 MILLIGRAM(S): 80 TABLET, FILM COATED ORAL at 23:40

## 2020-03-20 RX ADMIN — PIPERACILLIN AND TAZOBACTAM 3.38 GRAM(S): 4; .5 INJECTION, POWDER, LYOPHILIZED, FOR SOLUTION INTRAVENOUS at 16:43

## 2020-03-20 RX ADMIN — TAMSULOSIN HYDROCHLORIDE 0.4 MILLIGRAM(S): 0.4 CAPSULE ORAL at 23:40

## 2020-03-20 RX ADMIN — Medication 250 MILLIGRAM(S): at 18:45

## 2020-03-20 RX ADMIN — Medication 100 MILLIGRAM(S): at 17:38

## 2020-03-20 RX ADMIN — SODIUM CHLORIDE 1000 MILLILITER(S): 9 INJECTION INTRAMUSCULAR; INTRAVENOUS; SUBCUTANEOUS at 17:38

## 2020-03-20 NOTE — H&P ADULT - PROBLEM SELECTOR PLAN 3
Last stent placed reportedly over 10 years ago.   Start home dose of aspirin, statin, metoprolol. Patient states that his losartan had been discontinued months ago.

## 2020-03-20 NOTE — H&P ADULT - NSHPSOCIALHISTORY_GEN_ALL_CORE
The patient was , then remarried and .  He has a son.   He denies tobacco or alcohol use. He used marijuana in the 1960s.

## 2020-03-20 NOTE — ED ADULT NURSE NOTE - NSIMPLEMENTINTERV_GEN_ALL_ED
Implemented All Universal Safety Interventions:  [place] to call system. Call bell, personal items and telephone within reach. Instruct patient to call for assistance. Room bathroom lighting operational. Non-slip footwear when patient is off stretcher. Physically safe environment: no spills, clutter or unnecessary equipment. Stretcher in lowest position, wheels locked, appropriate side rails in place.

## 2020-03-20 NOTE — H&P ADULT - NSHPLABSRESULTS_GEN_ALL_CORE
Labs, imaging, urology note personally reviewed by me.     CBC notable for elevated WBC. ESR and CRP elevated.  UA positive for leukocyte esterase and blood.   CMP  with elevated SCr of 1.94    LABS:                        11.6   14.57 )-----------( 224      ( 20 Mar 2020 15:12 )             35.1     Hgb Trend: 11.6<--  03    137  |  102  |  33<H>  ----------------------------<  203<H>  4.0   |  19<L>  |  1.94<H>    Ca    10.3      20 Mar 2020 15:12    TPro  7.1  /  Alb  4.0  /  TBili  0.4  /  DBili  x   /  AST  13  /  ALT  10  /  AlkPhos  89      Creatinine Trend: 1.94<--  PT/INR - ( 20 Mar 2020 15:12 )   PT: 13.9 sec;   INR: 1.20 ratio         PTT - ( 20 Mar 2020 15:12 )  PTT:29.8 sec  Urinalysis Basic - ( 20 Mar 2020 18:16 )    Color: Light Yellow / Appearance: Turbid / S.022 / pH: x  Gluc: x / Ketone: Negative  / Bili: Negative / Urobili: Negative   Blood: x / Protein: 100 mg/dL / Nitrite: Negative   Leuk Esterase: Moderate / RBC: 40 /hpf / WBC >50   Sq Epi: x / Non Sq Epi: 2 / Bacteria: Negative    < from: CT Pelvis w/ IV Cont (20 @ 17:54) >    FINDINGS:    BLADDER: Collapsed around Davies catheter with a small amount of air.  REPRODUCTIVE ORGANS: Prostate is markedly enlarged. The bilateral epididymides are slightly hyperemic, likely corresponding to findings at sonography. Small bilateral hydroceles and scrotal edema. No subcutaneous emphysema.  LYMPH NODES: No pelvic lymphadenopathy.    VISUALIZED PORTIONS:   ABDOMINAL ORGANS: Within normal limits.  BOWEL: Scattered colonic diverticula. The appendix is normal.  PERITONEUM: No ascites.  VESSELS: Atherosclerotic changes.  ABDOMINAL WALL: Fat-containing ventral hernia.  BONES: Degenerative changes.    IMPRESSION:  No subcutaneous emphysema. 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    Small bilateral hydroceles and diffuse scrotal edema.  Marked enlargement of the prostate.  < end of copied text >    < from: US Testicles (20 @ 16:51) >    FINDINGS:    RIGHT:    Right testis: 3.5 x 2.6 x 2.5 cm. Normal echogenicity and echotexture with no masses or areas of architectural distortion. Diffuse parenchymal hyperemia by color Doppler. Normal arterial and venous waveforms.    Right epididymis: Diffusely thickened and hypoechoic, diffusely hyperemic by color Doppler. Increased echogenicity of adjacent fatty tissue.    Right hydrocele: Moderate hydrocele, complex by virtue of reticular septations within it, suspicious for hydrocele.    Right varicocele: None.      LEFT:   Left testis: 3.2 x 1.9 x 1.8 cm. Normal echogenicity and echotexture with no masses or areas of architectural distortion. Normal arterial and venous blood flow pattern.    Left epididymis: Thickening, decreased echogenicity, and hyperemia of the epididymal tail.  Left hydrocele: Small simple hydrocele.  Left varicocele: None.    IMPRESSION:   Acute right epididymoorchitis.  Mild left epididymitis confined to the epididymal tail.    < end of copied text >

## 2020-03-20 NOTE — ED PROVIDER NOTE - CLINICAL SUMMARY MEDICAL DECISION MAKING FREE TEXT BOX
ATTG: : scrotal edema/ erythema concern for obstructed catheter, and possible earnestine's will check ct a/p, urology, change cath, send cultures, ivf abx and admit to hosptal for further care.

## 2020-03-20 NOTE — H&P ADULT - PROBLEM SELECTOR PLAN 4
Patient reports metformin had been discontinued due to good glycemic control.  Will monitor fingerstick BS during day. If fingersticks WNL, will discontinue further monitoring.   Start consistent carbohydrate DASH diet.  Last Hgb A1C was 7 on 11/2019 Patient reports metformin had been discontinued due to good glycemic control.  Will monitor fingerstick BS during day. If fingersticks WNL, will discontinue further monitoring.   Start consistent carbohydrate DASH diet.  Last Hgb A1C was 6.4 on 11/2019

## 2020-03-20 NOTE — H&P ADULT - PROBLEM SELECTOR PLAN 1
CT and ultrasound imaging revealed acute R epididymo-orchitis and mild left epididymitis of the epididymal tail.  Patient also has moderate R hydrocele and small simple L hydrocele  Follow up result of urine culture. Patient had never been febrile.  Check blood cultures.   Continue zosyn, which was started in the ED, at 3.375g q8h.  Davies catheter was changed and is draining well. Monitor UO.

## 2020-03-20 NOTE — H&P ADULT - NSICDXPASTMEDICALHX_GEN_ALL_CORE_FT
PAST MEDICAL HISTORY:  CAD (coronary artery disease)     DM (diabetes mellitus), type 2     Dyslipidemia     History of BPH     HTN (hypertension)     Obesity

## 2020-03-20 NOTE — H&P ADULT - ATTENDING COMMENTS
Patient assigned to me by night hospitalist in charge for management and care for patient for this evening only. Care to be resumed by day hospitalist in the morning and thereafter.   Tiera Rice MD p [phone]

## 2020-03-20 NOTE — H&P ADULT - NSHPREVIEWOFSYSTEMS_GEN_ALL_CORE
REVIEW OF SYSTEMS  CONSTITUTIONAL: No fever, no chills  EYES: No eye pain, no vision changes  ENMT:  No difficulty hearing, no throat pain  RESPIRATORY: No cough, no No shortness of breath at rest  CARDIOVASCULAR: No chest pain, no palpitations, no leg swelling  GASTROINTESTINAL: No abdominal pain, no nausea, no vomiting  GENITOURINARY: + scrotal swelling and redness, no gross bleeding   NEUROLOGICAL: No headaches, no loss of strength, no numbness  SKIN: No itching, no rashes, no lesions   MUSCULOSKELETAL: No joint pain, no joint swelling; No muscle pain  HEME/LYMPH: No easy bruising, bleeding

## 2020-03-20 NOTE — H&P ADULT - NSHPPHYSICALEXAM_GEN_ALL_CORE
T(C): 37.2 (03-20-20 @ 19:28), Max: 37.2 (03-20-20 @ 19:28)  HR: 84 (03-20-20 @ 19:28) (84 - 93)  BP: 110/64 (03-20-20 @ 19:28) (110/64 - 139/74)  RR: 17 (03-20-20 @ 19:28) (17 - 19)  SpO2: 98% (03-20-20 @ 19:28) (96% - 98%)  Wt(kg): --    PHYSICAL EXAM:  GENERAL: NAD, well-groomed, well-developed  HEAD:  Atraumatic, Normocephalic  EYES: EOMI, PERRLA, conjunctiva and sclera clear  ENMT: No oropharyngeal exudates, erythema or lesions,  Moist mucous membranes  NECK: Supple, no cervical lymphadenopathy  NERVOUS SYSTEM:  Alert & Oriented X3, CN II-XII intact, 5/5 BUE and BLE motor strength, full sensation to light touch   CHEST/LUNG: Clear to auscultation bilaterally; No rales, no  rhonchi, no wheezing  HEART: Regular rate and rhythm; No murmurs, rubs, or gallops  ABDOMEN: Soft, Nontender, Nondistended  EXTREMITIES:  2+ Peripheral Pulses, No cyanosis or edema  SKIN: warm, dry  : chaperoned by JANEEN Das- scrotal edema and erythema, testicles palpable - R testicle tender to palpation , no open lesions or exudates, no crepitus, cho catheter in place draining clear yellow urine, mild R sided suprapubic tenderness to palpation, no CVAT bilaterally

## 2020-03-20 NOTE — H&P ADULT - PROBLEM SELECTOR PLAN 8
Transitions of Care Status:  1.  Name of PCP: Dr. Thomas  2.  PCP Contacted on Admission: [ ] Y    [x ] N    3.  PCP contacted at Discharge: [ ] Y    [ ] N    [ ] N/A  4.  Post-Discharge Appointment Date and Location:  5.  Summary of Handoff given to PCP:

## 2020-03-20 NOTE — ED PROVIDER NOTE - NS ED ROS FT
General: denies fever, chills  HENT: denies nasal congestion, rhinorrhea  CV: denies chest pain, palpitations  Resp: denies difficulty breathing, cough  Abdominal: denies nausea, vomiting, diarrhea, abdominal pain  : + decreased UO, + testicular pain and swelling  MSK: denies muscle aches, leg swelling  Neuro: denies headaches, numbness, tingling  Skin: denies rashes, bruises

## 2020-03-20 NOTE — H&P ADULT - PROBLEM SELECTOR PLAN 2
Patient's notes from Dr. 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 Pt Scr is elevated today, likely 2/2 obstructive uropathy.  Davies placed and draining, Will trend SCr.   Avoid nephrotoxic agents.

## 2020-03-20 NOTE — CONSULT NOTE ADULT - ASSESSMENT
Mr. Tsai is a 70 yo M with a PMHx of DM2, CAD s/p stents, HTN, HLD, BPH with chronic cho who presents with complaint of nondraining Cho and scrotal swelling and erythema x 2 days. Most likely epididymoorchitis based on physical exam findings and current available lab work. Less likely Yennifer's gangrene infection. Of note, Cho catheter flushed at the bedside using NS and piston syringe, and found to be functioning properly. Cho draining well after being flush.   Recommendations:  - CT abd/pelvis   - Scrotal U/S  - UA  - Urine Cx     [employer] PENDING PENDING IMAGING AND LABS Mr. Tsai is a 68 yo M with a PMHx of DM2, CAD s/p stents, HTN, HLD, BPH with chronic cho who presents with complaint of nondraining Cho and scrotal swelling and erythema x 2 days. Most likely epididymoorchitis based on physical exam findings and current available lab work. Less likely Yennifer's gangrene infection. Of note, Cho catheter flushed at the bedside using NS and piston syringe, and found to be functioning properly. Cho draining well after being flush.   Recommendations:  - CT abd/pelvis ordered  - F/U Scrotal doppler U/S  - UA  - Urine Cx     [employer] PENDING PENDING IMAGING AND LABS Mr. Tsai is a 68 yo M with a PMHx of DM2, CAD s/p stents, HTN, HLD, BPH with chronic cho who presents with complaint of nondraining Cho and scrotal swelling and erythema x 2 days. Most likely epididymoorchitis based on physical exam findings and current available lab work. Less likely Yennifer's gangrene infection. Of note, Cho catheter flushed at the bedside using NS and piston syringe, and found to be functioning properly. Cho draining well after being flush.   Recommendations:  - CT abd/pelvis with no acute surgical findings, f/u official read  - F/u Scrotal doppler U/S  - UA  - Urine Cx   -Can decrease Abx to Zosyn only Mr. Tsai is a 68 yo M with a PMHx of DM2, CAD s/p stents, HTN, HLD, BPH with chronic cho who presents with complaint of nondraining Cho and scrotal swelling and erythema x 2 days. Most likely epididymoorchitis based on physical exam findings and current available lab work. Less likely Yennifer's gangrene infection. Of note, Cho catheter flushed at the bedside using NS and piston syringe, and found to be functioning properly. Cho draining well after being flush.   Recommendations:  - CT abd/pelvis with no acute surgical findings  - F/u Scrotal doppler U/S  - UA  - Urine Cx   -Can decrease Abx to Zosyn only

## 2020-03-20 NOTE — CONSULT NOTE ADULT - SUBJECTIVE AND OBJECTIVE BOX
HPI: Mr. Tsai is a 70 yo M with a PMHx of DM2, CAD s/p stents, HTN, HLD, BPH with chronic cho who presents with complaint of nondraining Cho and scrotal swelling and erythema x 2 days. Patient states he follows up with Urologist Carlin Espinoza MD as an outpatient for management of his enlarged prostate and for routine Cho care. Pt saw outpatient provider two days ago when his cho was exchanged and pt states provider notified him of a suspected infection to the scrotum. Since his last office visit, scrotum has become progressively more swollen and red. Pt states it is causing him discomfort, especially to touch. Pt denies any fever/chills, URI symptoms, SOB, abdominal pain, N/V, changes to BMs, hematuria, flank pain or other urologic complaint.     PAST MEDICAL & SURGICAL HISTORY:  Obesity  HTN (hypertension)  Dyslipidemia  DM (diabetes mellitus), type 2  CAD (coronary artery disease)  Stented coronary artery    MEDICATIONS  (STANDING):  clindamycin IVPB 900 milliGRAM(s) IV Intermittent once  vancomycin  IVPB 1000 milliGRAM(s) IV Intermittent once    Allergies  No Known Allergies    REVIEW OF SYSTEMS: Otherwise negative as stated in HPI    Physical Exam  Vital signs  T(C): 37 (03-20-20 @ 13:53), Max: 37 (03-20-20 @ 13:53)  HR: 93 (03-20-20 @ 13:53)  BP: 133/80 (03-20-20 @ 13:53)  SpO2: 96% (03-20-20 @ 13:53)    Physical Exam:  Gen: No Acute Distress. A&Ox3  Pulm: No respiratory distress. No accessory muscle use. Breathing comfortably on room air.  GI: Soft, nondistended, nontender  : No suprapubic tenderness. Cho catheter in place draining murky yellow urine. Scrotum significantly enlarged, left hemiscrotum greater than right. Hydrocele palpable on the left. mildly tender to the touch. Diffuse mild erythema of entire scrotum. Testicles and vas deferens palpable. No crepitus felt on exam. No induration or discharge noted.  MSK: No lower extremity edema      LABS:  03-20 @ 15:12  WBC 14.57 / Hct 35.1  / SCr 1.94     03-20  137  |  102  |  33<H>  ----------------------------<  203<H>  4.0   |  19<L>  |  1.94<H>    Ca    10.3      20 Mar 2020 15:12    TPro  7.1  /  Alb  4.0  /  TBili  0.4  /  DBili  x   /  AST  13  /  ALT  10  /  AlkPhos  89  03-20    PT/INR - ( 20 Mar 2020 15:12 )   PT: 13.9 sec;   INR: 1.20 ratio    PTT - ( 20 Mar 2020 15:12 )  PTT:29.8 sec HPI: Mr. Tsai is a 70 yo M with a PMHx of DM2, CAD s/p stents, HTN, HLD, BPH with chronic cho 2/2 retention who presents with complaint of nondraining Cho and scrotal swelling and erythema x 2 days. Patient states he follows up with Urologist Carlin Espinoza MD as an outpatient for management of his enlarged prostate and for routine Cho care. Pt saw outpatient provider two days ago when his cho was exchanged and pt states provider notified him of a suspected infection to the scrotum. Since his last office visit, scrotum has become progressively more swollen and red. Pt states it is causing him discomfort, especially to touch. Pt denies any fever/chills, URI symptoms, SOB, abdominal pain, N/V, changes to BMs, hematuria, flank pain or other urologic complaint.     PAST MEDICAL & SURGICAL HISTORY:  Obesity  HTN (hypertension)  Dyslipidemia  DM (diabetes mellitus), type 2  CAD (coronary artery disease)  Stented coronary artery    MEDICATIONS  (STANDING):  clindamycin IVPB 900 milliGRAM(s) IV Intermittent once  vancomycin  IVPB 1000 milliGRAM(s) IV Intermittent once    Allergies  No Known Allergies    REVIEW OF SYSTEMS: Otherwise negative as stated in HPI      Vital signs  T(C): 37 (03-20-20 @ 13:53), Max: 37 (03-20-20 @ 13:53)  HR: 93 (03-20-20 @ 13:53)  BP: 133/80 (03-20-20 @ 13:53)  SpO2: 96% (03-20-20 @ 13:53)    Physical Exam:  Gen: No Acute Distress. A&Ox3  Pulm: No respiratory distress. No accessory muscle use. Breathing comfortably on room air.  GI: Soft, nondistended, nontender  : No suprapubic tenderness. Cho catheter in place draining murky yellow urine. Scrotum significantly enlarged, left hemiscrotum greater than right. Hydrocele palpable on the left. mildly tender to the touch. Diffuse mild erythema of entire scrotum. Testicles and vas deferens palpable. No crepitus felt on exam. No induration or discharge noted.  MSK: No lower extremity edema      LABS:  03-20 @ 15:12  WBC 14.57 / Hct 35.1  / SCr 1.94     03-20  137  |  102  |  33<H>  ----------------------------<  203<H>  4.0   |  19<L>  |  1.94<H>    Ca    10.3      20 Mar 2020 15:12    TPro  7.1  /  Alb  4.0  /  TBili  0.4  /  DBili  x   /  AST  13  /  ALT  10  /  AlkPhos  89  03-20    PT/INR - ( 20 Mar 2020 15:12 )   PT: 13.9 sec;   INR: 1.20 ratio    PTT - ( 20 Mar 2020 15:12 )  PTT:29.8 sec

## 2020-03-20 NOTE — H&P ADULT - HISTORY OF PRESENT ILLNESS
This patient is a 70yo M with PMH of T2DM, CAD s/p stents, htn, hLd, BPH s/p chronic cho due to urinary retention who presents to the ED due to non-draining cho catheter, and swelling of scrotum. The patinet started ----    He sees Dr. Carlin Espinoza from urology.      scrotal US done  epididymo-orchitis  CT w/ contrast to sofy jefferson This patient is a 70yo M with PMH of T2DM, CAD s/p stents, htn, hLd, BPH s/p chronic hco due to urinary retention who presents to the ED due to non-draining cho catheter, and swelling of scrotum. The patient noticed his cho catheter stopped draining 4 days ago. He thereafter had progressive scrotal redness, swelling and tenderness. He visited his urologist, Dr. Carlin Espinoza, who prescribed him bactrim DS for suspected infection, which he started to take without significant improvement. He denies fever, chills, but endorses some R sided lower back pain, worsened with movement. The patient denies suprapubic pain. Urine has has appeared, dark and cloudy, and he suspects may have had some blood, but no gross bleeding. He complains that he has been dealing with urinary retention at least since September 2019, and is planned to have surgical evaluation in the future.      Pt reports he had his prostate biopsied in the past with benign results and underwent MRI which found a "bright spot" for which he is undergoing ocntinued evaluation.

## 2020-03-20 NOTE — ED PROVIDER NOTE - ATTENDING CONTRIBUTION TO CARE
68 y/o m with pmhx HTN, DM type2, HLD,  CAD, obesity presents for concern of min out put from his cho cath and also worsening erythema and edema of groin. no fever or chills. no vomiting or diarrhea. no back pain. no cough. no recent travel  Urologist Dr. Espinoza  PMD Dr. Thomas  Gen.  no acute distress  HEENT:  perrl eomi   Lungs:  b/l bs  CVS: S1S2   Abd;  soft non tender no distention  Ext:  no pitting edema or erythema   exam done with Dr. Clark + scrotal erythema and edema +ttp,. cho cath in place. no drainage. no palpable masses  Neuro: aaox3 no focal deficits  MSK: strength 5/5 b/l upper and lower ext.

## 2020-03-20 NOTE — ED PROVIDER NOTE - PROGRESS NOTE DETAILS
attending Dillon: pt evaluated by urology who report low suspicion for Yennifer's gangrene. Received abx. Urology requesting scrotal US. attending Dillon: US showing epididymoorchitis. Urology aware. Pt with elevated creatinine, will go ahead with CT with IV contrast to eval collection or subq air. Potential benefit of scan outweighs risk of contrast-induced nephropathy.

## 2020-03-20 NOTE — ED PROVIDER NOTE - PHYSICAL EXAMINATION
CONSTITUTIONAL: NAD, awake, alert  HEAD: Normocephalic; atraumatic  EYES: EOMI, no nystagmus  ENMT: External appears normal, MMM  NECK: no tenderness, FROM  CARD: Normal Sl, S2; no audible murmurs  RESP: normal wob, lungs ctab  ABD: soft, non-distended; non-tender  MSK: no edema, normal ROM in all four extremities  : + testicular edema, and erythema, TTP, no crepitus   SKIN: Warm, dry, no rashes  NEURO: aaox3, moving all extremities spontaneously

## 2020-03-20 NOTE — H&P ADULT - PROBLEM SELECTOR PLAN 5
Start home dose of metoprolol tartrate and nifedipine.  Patient only takes metoprolol tartrate q daily, can increase to BID if necessary.

## 2020-03-20 NOTE — H&P ADULT - ASSESSMENT
This patient is a 68yo M with PMH of T2DM, CAD s/p stents, htn, hLd, BPH s/p chronic cho due to urinary retention who presents to the ED due to non-draining cho catheter, and swelling of scrotum. The patient noticed his cho catheter stopped draining 4 days ago. He thereafter had progressive scrotal redness, swelling and tenderness. He visited his urologist, Dr. Carlin Espinoza, who prescribed him bactrim DS for suspected infection, which he started to take without significant improvement. He denies fever, chills, but endorses some R sided lower back pain, worsened with movement. The patient denies suprapubic pain. Urine has has appeared, dark and cloudy, and he suspects may have had some blood, but no gross bleeding. He complains that he has been dealing with urinary retention at least since September 2019, and is planned to have surgical evaluation in the future. This patient is a 70yo M with PMH of T2DM, CAD s/p stents, htn, hLd, BPH s/p chronic cho due to urinary retention who presents to the ED due to non-draining cho catheter, and swelling of scrotum, found to have epididymo-orchitis and ZIA.

## 2020-03-20 NOTE — ED ADULT NURSE REASSESSMENT NOTE - NS ED NURSE REASSESS COMMENT FT1
Pts cho cath removed and a new Cho catheter inserted using sterile technique. Second JANEEN Wiggins present to confirm sterility. Bedside drainage to gravity. Stat lock in place.

## 2020-03-20 NOTE — ED PROVIDER NOTE - OBJECTIVE STATEMENT
69F w/ HTN, T2DM, off diabetic medications now, HLD, CAD, BPH w/ chronic cho p/w decreased output from his cho catheter and worsening swelling of his groin for the past few days. Denies fevers, chills, back pain, dysuria.

## 2020-03-21 LAB
ANION GAP SERPL CALC-SCNC: 18 MMOL/L — HIGH (ref 5–17)
BUN SERPL-MCNC: 22 MG/DL — SIGNIFICANT CHANGE UP (ref 7–23)
CALCIUM SERPL-MCNC: 10.6 MG/DL — HIGH (ref 8.4–10.5)
CHLORIDE SERPL-SCNC: 102 MMOL/L — SIGNIFICANT CHANGE UP (ref 96–108)
CO2 SERPL-SCNC: 17 MMOL/L — LOW (ref 22–31)
CREAT SERPL-MCNC: 1.73 MG/DL — HIGH (ref 0.5–1.3)
CULTURE RESULTS: SIGNIFICANT CHANGE UP
GLUCOSE BLDC GLUCOMTR-MCNC: 170 MG/DL — HIGH (ref 70–99)
GLUCOSE BLDC GLUCOMTR-MCNC: 172 MG/DL — HIGH (ref 70–99)
GLUCOSE BLDC GLUCOMTR-MCNC: 258 MG/DL — HIGH (ref 70–99)
GLUCOSE SERPL-MCNC: 198 MG/DL — HIGH (ref 70–99)
HBA1C BLD-MCNC: 7.6 % — HIGH (ref 4–5.6)
HCT VFR BLD CALC: 38.5 % — LOW (ref 39–50)
HCV AB S/CO SERPL IA: 0.12 S/CO — SIGNIFICANT CHANGE UP (ref 0–0.99)
HCV AB SERPL-IMP: SIGNIFICANT CHANGE UP
HGB BLD-MCNC: 12 G/DL — LOW (ref 13–17)
MCHC RBC-ENTMCNC: 26.9 PG — LOW (ref 27–34)
MCHC RBC-ENTMCNC: 31.2 GM/DL — LOW (ref 32–36)
MCV RBC AUTO: 86.3 FL — SIGNIFICANT CHANGE UP (ref 80–100)
NRBC # BLD: 0 /100 WBCS — SIGNIFICANT CHANGE UP (ref 0–0)
PLATELET # BLD AUTO: 242 K/UL — SIGNIFICANT CHANGE UP (ref 150–400)
POTASSIUM SERPL-MCNC: 3.7 MMOL/L — SIGNIFICANT CHANGE UP (ref 3.5–5.3)
POTASSIUM SERPL-SCNC: 3.7 MMOL/L — SIGNIFICANT CHANGE UP (ref 3.5–5.3)
RBC # BLD: 4.46 M/UL — SIGNIFICANT CHANGE UP (ref 4.2–5.8)
RBC # FLD: 15.2 % — HIGH (ref 10.3–14.5)
SODIUM SERPL-SCNC: 137 MMOL/L — SIGNIFICANT CHANGE UP (ref 135–145)
SPECIMEN SOURCE: SIGNIFICANT CHANGE UP
WBC # BLD: 15.82 K/UL — HIGH (ref 3.8–10.5)
WBC # FLD AUTO: 15.82 K/UL — HIGH (ref 3.8–10.5)

## 2020-03-21 PROCEDURE — 99231 SBSQ HOSP IP/OBS SF/LOW 25: CPT

## 2020-03-21 RX ORDER — SODIUM CHLORIDE 9 MG/ML
1000 INJECTION, SOLUTION INTRAVENOUS
Refills: 0 | Status: DISCONTINUED | OUTPATIENT
Start: 2020-03-21 | End: 2020-03-23

## 2020-03-21 RX ORDER — INSULIN LISPRO 100/ML
VIAL (ML) SUBCUTANEOUS AT BEDTIME
Refills: 0 | Status: DISCONTINUED | OUTPATIENT
Start: 2020-03-21 | End: 2020-03-23

## 2020-03-21 RX ORDER — DEXTROSE 50 % IN WATER 50 %
25 SYRINGE (ML) INTRAVENOUS ONCE
Refills: 0 | Status: DISCONTINUED | OUTPATIENT
Start: 2020-03-21 | End: 2020-03-23

## 2020-03-21 RX ORDER — DEXTROSE 50 % IN WATER 50 %
15 SYRINGE (ML) INTRAVENOUS ONCE
Refills: 0 | Status: DISCONTINUED | OUTPATIENT
Start: 2020-03-21 | End: 2020-03-23

## 2020-03-21 RX ORDER — GLUCAGON INJECTION, SOLUTION 0.5 MG/.1ML
1 INJECTION, SOLUTION SUBCUTANEOUS ONCE
Refills: 0 | Status: DISCONTINUED | OUTPATIENT
Start: 2020-03-21 | End: 2020-03-23

## 2020-03-21 RX ORDER — DEXTROSE 50 % IN WATER 50 %
12.5 SYRINGE (ML) INTRAVENOUS ONCE
Refills: 0 | Status: DISCONTINUED | OUTPATIENT
Start: 2020-03-21 | End: 2020-03-23

## 2020-03-21 RX ORDER — INSULIN LISPRO 100/ML
VIAL (ML) SUBCUTANEOUS
Refills: 0 | Status: DISCONTINUED | OUTPATIENT
Start: 2020-03-21 | End: 2020-03-23

## 2020-03-21 RX ADMIN — TAMSULOSIN HYDROCHLORIDE 0.4 MILLIGRAM(S): 0.4 CAPSULE ORAL at 22:15

## 2020-03-21 RX ADMIN — Medication 2 GRAM(S): at 18:57

## 2020-03-21 RX ADMIN — ATORVASTATIN CALCIUM 80 MILLIGRAM(S): 80 TABLET, FILM COATED ORAL at 22:15

## 2020-03-21 RX ADMIN — Medication 1000 UNIT(S): at 12:14

## 2020-03-21 RX ADMIN — Medication 2 GRAM(S): at 06:36

## 2020-03-21 RX ADMIN — PIPERACILLIN AND TAZOBACTAM 25 GRAM(S): 4; .5 INJECTION, POWDER, LYOPHILIZED, FOR SOLUTION INTRAVENOUS at 06:36

## 2020-03-21 RX ADMIN — HEPARIN SODIUM 5000 UNIT(S): 5000 INJECTION INTRAVENOUS; SUBCUTANEOUS at 06:35

## 2020-03-21 RX ADMIN — HEPARIN SODIUM 5000 UNIT(S): 5000 INJECTION INTRAVENOUS; SUBCUTANEOUS at 12:14

## 2020-03-21 RX ADMIN — HEPARIN SODIUM 5000 UNIT(S): 5000 INJECTION INTRAVENOUS; SUBCUTANEOUS at 22:15

## 2020-03-21 RX ADMIN — Medication 81 MILLIGRAM(S): at 12:14

## 2020-03-21 RX ADMIN — PIPERACILLIN AND TAZOBACTAM 25 GRAM(S): 4; .5 INJECTION, POWDER, LYOPHILIZED, FOR SOLUTION INTRAVENOUS at 14:00

## 2020-03-21 RX ADMIN — PIPERACILLIN AND TAZOBACTAM 25 GRAM(S): 4; .5 INJECTION, POWDER, LYOPHILIZED, FOR SOLUTION INTRAVENOUS at 22:12

## 2020-03-21 RX ADMIN — Medication 1: at 18:57

## 2020-03-21 RX ADMIN — LAMOTRIGINE 25 MILLIGRAM(S): 25 TABLET, ORALLY DISINTEGRATING ORAL at 22:14

## 2020-03-21 NOTE — PROCEDURE NOTE - ADDITIONAL PROCEDURE DETAILS
Urology called for non-draining catheter, unable to irrigate through 16Fr silicon cho.  Removed and revealed small clot plugging catheter tip.  18Fr coude placed without significant resistance.  Return of clear yellow urine ~650cc, significant relief from patient.

## 2020-03-21 NOTE — PROVIDER CONTACT NOTE (OTHER) - ASSESSMENT
Davies irrigated per verbal order with 40cc sterile water, flushed with no resistance. Unable to aspirate contents that was flushed.

## 2020-03-21 NOTE — PROCEDURE NOTE - NSURITECHNIQUE_GU_A_CORE
Sterile gloves were worn for the duration of the procedure/The catheter was appropriately lubricated/The collection bag is below the level of the patient and urinary bladder/Proper hand hygiene was performed/A sterile drape was used to cover all adjacent areas/The site was cleaned with soap/water and sterile solution (betadine)/The catheter was secured with a securement device (e.g. StatLock)/The urinary drainage system is closed at the end of the procedure/All applicable medical record documentation is completed

## 2020-03-21 NOTE — PROGRESS NOTE ADULT - ASSESSMENT
70yo M with     T2DM, CAD s/p stents, 10  yrs  ago, , , htn, hLd   , BPH s/p chronic cho due to urinary retention   HTN.  DM 2.       admitted with   non-draining cho catheter   , and swelling of scrotum,   c/w   b/l   epididymo-orchitis and ZIA.  on  zosyn   ID dr villanueva    dvt  ppx     < from: CT Pelvis w/ IV Cont (03.20.20 @ 17:54) >  MPRESSION:  No subcutaneous emphysema. 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  Small bilateral hydroceles and diffuse scrotal edema.  Marked enlargement of the prostate.  < end of copied text >       < from: US Doppler Scrotum (03.20.20 @ 16:51) >  IMPRESSION:   Acute rght epididymoorchitis.  Mild left epididymitis confined to the epididymal tail  < end of copied text > 70yo M with     T2DM, CAD s/p stents, 10  yrs  ago, , , htn, hLd   , BPH s/p chronic cho due to urinary retention   HTN.  DM 2.       admitted with   non-draining cho catheter   , and swelling of scrotum,   c/w   b/l   epididymo-orchitis and ZIA.  on  zosyn,  swelling./ redness  is  better  pt  feels better   ID dr villanueva    dvt  ppx     < from: CT Pelvis w/ IV Cont (03.20.20 @ 17:54) >  MPRESSION:  No subcutaneous emphysema. 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  Small bilateral hydroceles and diffuse scrotal edema.  Marked enlargement of the prostate.  < end of copied text >       < from: US Doppler Scrotum (03.20.20 @ 16:51) >  IMPRESSION:   Acute rght epididymoorchitis.  Mild left epididymitis confined to the epididymal tail  < end of copied text >

## 2020-03-21 NOTE — CONSULT NOTE ADULT - SUBJECTIVE AND OBJECTIVE BOX
Patient is a 69y old  Male who presents with a chief complaint of non-draining cho catheter and scrotal edema (21 Mar 2020 11:28)      HPI:    This patient is a 70yo M with PMH of T2DM, CAD s/p stents, htn, hLd, BPH s/p chronic cho due to urinary retention who presents to the ED due to non-draining cho catheter, and swelling of scrotum. The patient noticed his cho catheter stopped draining 4 days ago. He thereafter had progressive scrotal redness, swelling and tenderness. He visited his urologist, Dr. Carlin Espinoza, who prescribed him bactrim DS for suspected infection, which he started to take without significant improvement. He denies fever, chills, but endorses some R sided lower back pain, worsened with movement. The patient denies suprapubic pain. Urine has appeared, dark and cloudy, and he suspects may have had some blood, but no gross bleeding. He complains that he has been dealing with urinary retention at least since 2019, and is planned to have surgical evaluation in the future.      Pt reports he had his prostate biopsied in the past with benign results and underwent MRI which found a "bright spot" for which he is undergoing continued evaluation. (20 Mar 2020 20:52)  CTap shows Prostate is markedly enlarged. The bilateral epididymides are slightly hyperemic, likely corresponding to findings at sonography. Small bilateral hydroceles and scrotal edema. No subcutaneous emphysema.    Pt seen by Urology.  Cho catheter flushed at the bedside using NS and piston syringe, and found to be functioning properly. Cho draining well after being flush.  Pt on zosyn.  ID consult called for further abx managment.         REVIEW OF SYSTEMS:    CONSTITUTIONAL: No fever, weight loss, or fatigue  EYES: No eye pain, visual disturbances, or discharge  ENMT:  No sore throat  NECK: No pain or stiffness  RESPIRATORY: No cough, wheezing, chills or hemoptysis; No shortness of breath  CARDIOVASCULAR: No chest pain, palpitations, dizziness, or leg swelling  GASTROINTESTINAL: No abdominal or epigastric pain. No nausea, vomiting, or hematemesis; No diarrhea or constipation. No melena or hematochezia.  GENITOURINARY: No dysuria, frequency, hematuria, or incontinence  NEUROLOGICAL: No headaches, memory loss, loss of strength, numbness, or tremors  SKIN: No itching, burning, rashes, or lesions   LYMPH NODES: No enlarged glands  MUSCULOSKELETAL: No joint pain or swelling; No muscle, back, or extremity pain      PAST MEDICAL & SURGICAL HISTORY:  History of BPH  Obesity  HTN (hypertension)  Dyslipidemia  DM (diabetes mellitus), type 2  CAD (coronary artery disease)  Stented coronary artery      Allergies    No Known Allergies    Intolerances        FAMILY HISTORY:  FH: depression: sister  FH: stroke: father      SOCIAL HISTORY:    The patient was , then remarried and .  	He has a son.   He denies tobacco or alcohol use. He used marijuana in the 1960s.      MEDICATIONS  (STANDING):  aspirin enteric coated 81 milliGRAM(s) Oral daily  atorvastatin 80 milliGRAM(s) Oral at bedtime  cholecalciferol 1000 Unit(s) Oral daily  dextrose 5%. 1000 milliLiter(s) (50 mL/Hr) IV Continuous <Continuous>  dextrose 50% Injectable 12.5 Gram(s) IV Push once  dextrose 50% Injectable 25 Gram(s) IV Push once  dextrose 50% Injectable 25 Gram(s) IV Push once  heparin  Injectable 5000 Unit(s) SubCutaneous every 8 hours  insulin lispro (HumaLOG) corrective regimen sliding scale   SubCutaneous three times a day before meals  insulin lispro (HumaLOG) corrective regimen sliding scale   SubCutaneous at bedtime  lamoTRIgine 25 milliGRAM(s) Oral daily  metoprolol tartrate 25 milliGRAM(s) Oral daily  NIFEdipine XL 30 milliGRAM(s) Oral daily  omega-3-Acid Ethyl Esters 2 Gram(s) Oral two times a day  piperacillin/tazobactam IVPB.. 3.375 Gram(s) IV Intermittent every 8 hours  tamsulosin 0.4 milliGRAM(s) Oral at bedtime    MEDICATIONS  (PRN):  acetaminophen   Tablet .. 650 milliGRAM(s) Oral every 6 hours PRN Temp greater or equal to 38C (100.4F), Mild Pain (1 - 3), Moderate Pain (4 - 6)  dextrose 40% Gel 15 Gram(s) Oral once PRN Blood Glucose LESS THAN 70 milliGRAM(s)/deciliter  glucagon  Injectable 1 milliGRAM(s) IntraMuscular once PRN Glucose LESS THAN 70 milligrams/deciliter      Vital Signs Last 24 Hrs  T(C): 36.6 (21 Mar 2020 16:25), Max: 37.2 (20 Mar 2020 19:28)  T(F): 97.9 (21 Mar 2020 16:25), Max: 98.9 (20 Mar 2020 19:28)  HR: 92 (21 Mar 2020 16:25) (80 - 106)  BP: 154/82 (21 Mar 2020 16:25) (110/64 - 154/82)  BP(mean): --  RR: 18 (21 Mar 2020 16:25) (17 - 18)  SpO2: 96% (21 Mar 2020 16:25) (95% - 98%)    PHYSICAL EXAM:    GENERAL: NAD, well-groomed  HEAD:  Atraumatic, Normocephalic  EYES: EOMI, PERRLA, conjunctiva and sclera clear  ENMT: No tonsillar erythema, exudates, or enlargement; Moist mucous membranes  NECK: Supple, No JVD  CHEST/LUNG: Clear to percussion bilaterally; No rales, rhonchi, wheezing, or rubs  HEART: Regular rate and rhythm; No murmurs, rubs, or gallops  ABDOMEN: Soft, Nontender, Nondistended; Bowel sounds present  EXTREMITIES:  2+ Peripheral Pulses, No clubbing, cyanosis, or edema  LYMPH: No lymphadenopathy noted  SKIN: No rashes or lesions    LABS:  CBC Full  -  ( 21 Mar 2020 06:38 )  WBC Count : 15.82 K/uL  RBC Count : 4.46 M/uL  Hemoglobin : 12.0 g/dL  Hematocrit : 38.5 %  Platelet Count - Automated : 242 K/uL  Mean Cell Volume : 86.3 fl  Mean Cell Hemoglobin : 26.9 pg  Mean Cell Hemoglobin Concentration : 31.2 gm/dL  Auto Neutrophil # : x  Auto Lymphocyte # : x  Auto Monocyte # : x  Auto Eosinophil # : x  Auto Basophil # : x  Auto Neutrophil % : x  Auto Lymphocyte % : x  Auto Monocyte % : x  Auto Eosinophil % : x  Auto Basophil % : x      03-    137  |  102  |  22  ----------------------------<  198<H>  3.7   |  17<L>  |  1.73<H>    Ca    10.6<H>      21 Mar 2020 06:42    TPro  7.1  /  Alb  4.0  /  TBili  0.4  /  DBili  x   /  AST  13  /  ALT  10  /  AlkPhos  89  03-20      LIVER FUNCTIONS - ( 20 Mar 2020 15:12 )  Alb: 4.0 g/dL / Pro: 7.1 g/dL / ALK PHOS: 89 U/L / ALT: 10 U/L / AST: 13 U/L / GGT: x                               MICROBIOLOGY:        Urinalysis Basic - ( 20 Mar 2020 18:16 )    Color: Light Yellow / Appearance: Turbid / S.022 / pH: x  Gluc: x / Ketone: Negative  / Bili: Negative / Urobili: Negative   Blood: x / Protein: 100 mg/dL / Nitrite: Negative   Leuk Esterase: Moderate / RBC: 40 /hpf / WBC >50   Sq Epi: x / Non Sq Epi: 2 / Bacteria: Negative        Culture - Urine (20 @ 21:53)    Specimen Source: .Urine Clean Catch (Midstream)    Culture Results:   <10,000 CFU/mL Normal Urogenital Teri            RADIOLOGY:      < from: CT Pelvis w/ IV Cont (20 @ 17:54) >  EXAM:  CT PELVIS ONLY IC                            PROCEDURE DATE:  2020            INTERPRETATION:  CLINICAL INFORMATION: Testicular pain    COMPARISON: Scrotal ultrasound 3/20/2020    PROCEDURE:   CT of the Pelvis was performed with intravenous contrast.   Intravenous contrast: 90 ml Omnipaque 350. 10 ml discarded.  Oral contrast: None.  Sagittal and coronal reformats were performed.    FINDINGS:    BLADDER: Collapsed around Cho catheter with a small amount of air.  REPRODUCTIVE ORGANS: Prostate is markedly enlarged. The bilateral epididymides are slightly hyperemic, likely corresponding to findings at sonography. Small bilateral hydroceles and scrotal edema. No subcutaneous emphysema.  LYMPH NODES: No pelvic lymphadenopathy.    VISUALIZED PORTIONS:    ABDOMINAL ORGANS: Within normal limits.  BOWEL: Scattered colonic diverticula. The appendix is normal.  PERITONEUM: No ascites.  VESSELS: Atherosclerotic changes.  ABDOMINAL WALL: Fat-containing ventral hernia.  BONES: Degenerative changes.    IMPRESSION:     No subcutaneous emphysema. 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    Small bilateral hydroceles and diffuse scrotal edema.    Marked enlargement of the prostate.    < end of copied text >          < from: US Doppler Scrotum (20 @ 16:51) >  EXAM:  US SCROTUM AND CONTENTS                          EXAM:  US DPLX SCROTUM                            PROCEDURE DATE:  2020            INTERPRETATION:  CLINICAL INFORMATION: Malfunctioning Cho catheter, scrotal swelling.    COMPARISON: Scrotal sonogram dated 2013.    TECHNIQUE: Testicular ultrasound utilizing color and spectral Doppler.    FINDINGS:    RIGHT:    Right testis: 3.5 x 2.6 x 2.5 cm. Normal echogenicity and echotexture with no masses or areas of architectural distortion. Diffuse parenchymal hyperemia by color Doppler. Normal arterial and venous waveforms.    Right epididymis: Diffusely thickened and hypoechoic, diffusely hyperemic by color Doppler. Increased echogenicity of adjacent fatty tissue.    Right hydrocele: Moderate hydrocele, complex by virtue of reticular septations within it, suspicious for hydrocele.    Right varicocele: None.      LEFT:     Left testis: 3.2 x 1.9 x 1.8 cm. Normal echogenicity and echotexture with no masses or areas of architectural distortion. Normal arterial and venous blood flow pattern.    Left epididymis: Thickening, decreased echogenicity, and hyperemia of the epididymal tail.    Left hydrocele: Small simple hydrocele.    Left varicocele: None.    IMPRESSION:     Acute right epididymoorchitis.    Mild left epididymitis confined to the epididymal tail.    < end of copied text > Patient is a 69y old  Male who presents with a chief complaint of non-draining cho catheter and scrotal edema (21 Mar 2020 11:28)      HPI:    This patient is a 68yo M with PMH of T2DM, CAD s/p stents, htn, hLd, BPH s/p chronic cho due to urinary retention who presents to the ED due to non-draining cho catheter, and swelling of scrotum. The patient noticed his cho catheter stopped draining 4 days ago. He thereafter had progressive scrotal redness, swelling and tenderness. He visited his urologist, Dr. Carlin Espinoza, who prescribed him bactrim DS for suspected infection, which he started to take without significant improvement. He denies fever, chills, but endorses some R sided lower back pain, worsened with movement. The patient denies suprapubic pain. Urine has appeared, dark and cloudy, and he suspects may have had some blood, but no gross bleeding. He complains that he has been dealing with urinary retention at least since 2019, and is planned to have surgical evaluation in the future.      Pt reports he had his prostate biopsied in the past with benign results and underwent MRI which found a "bright spot" for which he is undergoing continued evaluation. (20 Mar 2020 20:52)  CTap shows Prostate is markedly enlarged. The bilateral epididymides are slightly hyperemic, likely corresponding to findings at sonography. Small bilateral hydroceles and scrotal edema. No subcutaneous emphysema.    Pt seen by Urology.  Cho catheter flushed at the bedside using NS and piston syringe, and found to be functioning properly. Cho draining well after being flush.  Pt on zosyn.  ID consult called for further abx managment.   Pt currently denies fever, chills, abd pain.        REVIEW OF SYSTEMS:    CONSTITUTIONAL: No fever, weight loss, or fatigue  EYES: No eye pain, visual disturbances, or discharge  ENMT:  No sore throat  NECK: No pain or stiffness  RESPIRATORY: No cough, wheezing, chills or hemoptysis; No shortness of breath  CARDIOVASCULAR: No chest pain, palpitations, dizziness, or leg swelling  GASTROINTESTINAL: No abdominal or epigastric pain. No nausea, vomiting, or hematemesis; No diarrhea or constipation. No melena or hematochezia.  GENITOURINARY: No dysuria, frequency, hematuria, or incontinence  NEUROLOGICAL: No headaches, memory loss, loss of strength, numbness, or tremors  SKIN: No itching, burning, rashes, or lesions   LYMPH NODES: No enlarged glands  MUSCULOSKELETAL: No joint pain or swelling; No muscle, back, or extremity pain      PAST MEDICAL & SURGICAL HISTORY:  History of BPH  Obesity  HTN (hypertension)  Dyslipidemia  DM (diabetes mellitus), type 2  CAD (coronary artery disease)  Stented coronary artery      Allergies    No Known Allergies    Intolerances        FAMILY HISTORY:  FH: depression: sister  FH: stroke: father      SOCIAL HISTORY:    The patient was , then remarried and .  	He has a son.   He denies tobacco or alcohol use. He used marijuana in the 1960s.      MEDICATIONS  (STANDING):  aspirin enteric coated 81 milliGRAM(s) Oral daily  atorvastatin 80 milliGRAM(s) Oral at bedtime  cholecalciferol 1000 Unit(s) Oral daily  dextrose 5%. 1000 milliLiter(s) (50 mL/Hr) IV Continuous <Continuous>  dextrose 50% Injectable 12.5 Gram(s) IV Push once  dextrose 50% Injectable 25 Gram(s) IV Push once  dextrose 50% Injectable 25 Gram(s) IV Push once  heparin  Injectable 5000 Unit(s) SubCutaneous every 8 hours  insulin lispro (HumaLOG) corrective regimen sliding scale   SubCutaneous three times a day before meals  insulin lispro (HumaLOG) corrective regimen sliding scale   SubCutaneous at bedtime  lamoTRIgine 25 milliGRAM(s) Oral daily  metoprolol tartrate 25 milliGRAM(s) Oral daily  NIFEdipine XL 30 milliGRAM(s) Oral daily  omega-3-Acid Ethyl Esters 2 Gram(s) Oral two times a day  piperacillin/tazobactam IVPB.. 3.375 Gram(s) IV Intermittent every 8 hours  tamsulosin 0.4 milliGRAM(s) Oral at bedtime    MEDICATIONS  (PRN):  acetaminophen   Tablet .. 650 milliGRAM(s) Oral every 6 hours PRN Temp greater or equal to 38C (100.4F), Mild Pain (1 - 3), Moderate Pain (4 - 6)  dextrose 40% Gel 15 Gram(s) Oral once PRN Blood Glucose LESS THAN 70 milliGRAM(s)/deciliter  glucagon  Injectable 1 milliGRAM(s) IntraMuscular once PRN Glucose LESS THAN 70 milligrams/deciliter      Vital Signs Last 24 Hrs  T(C): 36.6 (21 Mar 2020 16:25), Max: 37.2 (20 Mar 2020 19:28)  T(F): 97.9 (21 Mar 2020 16:25), Max: 98.9 (20 Mar 2020 19:28)  HR: 92 (21 Mar 2020 16:25) (80 - 106)  BP: 154/82 (21 Mar 2020 16:25) (110/64 - 154/82)  BP(mean): --  RR: 18 (21 Mar 2020 16:25) (17 - 18)  SpO2: 96% (21 Mar 2020 16:25) (95% - 98%)    PHYSICAL EXAM:    GENERAL: NAD, well-groomed  HEAD:  Atraumatic, Normocephalic  EYES: EOMI, PERRLA, conjunctiva and sclera clear  ENMT: No tonsillar erythema, exudates, or enlargement; Moist mucous membranes  NECK: Supple, No JVD  CHEST/LUNG: Clear to percussion bilaterally; No rales, rhonchi, wheezing, or rubs  HEART: Regular rate and rhythm; No murmurs, rubs, or gallops  ABDOMEN: Soft, Nontender, Nondistended; Bowel sounds present  EXTREMITIES:  2+ Peripheral Pulses, No clubbing, cyanosis, or edema  LYMPH: No lymphadenopathy noted  SKIN: No rashes or lesions  :  cho draining clear yellow urine    LABS:  CBC Full  -  ( 21 Mar 2020 06:38 )  WBC Count : 15.82 K/uL  RBC Count : 4.46 M/uL  Hemoglobin : 12.0 g/dL  Hematocrit : 38.5 %  Platelet Count - Automated : 242 K/uL  Mean Cell Volume : 86.3 fl  Mean Cell Hemoglobin : 26.9 pg  Mean Cell Hemoglobin Concentration : 31.2 gm/dL  Auto Neutrophil # : x  Auto Lymphocyte # : x  Auto Monocyte # : x  Auto Eosinophil # : x  Auto Basophil # : x  Auto Neutrophil % : x  Auto Lymphocyte % : x  Auto Monocyte % : x  Auto Eosinophil % : x  Auto Basophil % : x      03-    137  |  102  |  22  ----------------------------<  198<H>  3.7   |  17<L>  |  1.73<H>    Ca    10.6<H>      21 Mar 2020 06:42    TPro  7.1  /  Alb  4.0  /  TBili  0.4  /  DBili  x   /  AST  13  /  ALT  10  /  AlkPhos  89        LIVER FUNCTIONS - ( 20 Mar 2020 15:12 )  Alb: 4.0 g/dL / Pro: 7.1 g/dL / ALK PHOS: 89 U/L / ALT: 10 U/L / AST: 13 U/L / GGT: x                               MICROBIOLOGY:        Urinalysis Basic - ( 20 Mar 2020 18:16 )    Color: Light Yellow / Appearance: Turbid / S.022 / pH: x  Gluc: x / Ketone: Negative  / Bili: Negative / Urobili: Negative   Blood: x / Protein: 100 mg/dL / Nitrite: Negative   Leuk Esterase: Moderate / RBC: 40 /hpf / WBC >50   Sq Epi: x / Non Sq Epi: 2 / Bacteria: Negative        Culture - Urine (20 @ 21:53)    Specimen Source: .Urine Clean Catch (Midstream)    Culture Results:   <10,000 CFU/mL Normal Urogenital Teri            RADIOLOGY:      < from: CT Pelvis w/ IV Cont (20 @ 17:54) >  EXAM:  CT PELVIS ONLY IC                            PROCEDURE DATE:  2020            INTERPRETATION:  CLINICAL INFORMATION: Testicular pain    COMPARISON: Scrotal ultrasound 3/20/2020    PROCEDURE:   CT of the Pelvis was performed with intravenous contrast.   Intravenous contrast: 90 ml Omnipaque 350. 10 ml discarded.  Oral contrast: None.  Sagittal and coronal reformats were performed.    FINDINGS:    BLADDER: Collapsed around Cho catheter with a small amount of air.  REPRODUCTIVE ORGANS: Prostate is markedly enlarged. The bilateral epididymides are slightly hyperemic, likely corresponding to findings at sonography. Small bilateral hydroceles and scrotal edema. No subcutaneous emphysema.  LYMPH NODES: No pelvic lymphadenopathy.    VISUALIZED PORTIONS:    ABDOMINAL ORGANS: Within normal limits.  BOWEL: Scattered colonic diverticula. The appendix is normal.  PERITONEUM: No ascites.  VESSELS: Atherosclerotic changes.  ABDOMINAL WALL: Fat-containing ventral hernia.  BONES: Degenerative changes.    IMPRESSION:     No subcutaneous emphysema. 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    Small bilateral hydroceles and diffuse scrotal edema.    Marked enlargement of the prostate.    < end of copied text >          < from: US Doppler Scrotum (20 @ 16:51) >  EXAM:  US SCROTUM AND CONTENTS                          EXAM:  US DPLX SCROTUM                            PROCEDURE DATE:  2020            INTERPRETATION:  CLINICAL INFORMATION: Malfunctioning Cho catheter, scrotal swelling.    COMPARISON: Scrotal sonogram dated 2013.    TECHNIQUE: Testicular ultrasound utilizing color and spectral Doppler.    FINDINGS:    RIGHT:    Right testis: 3.5 x 2.6 x 2.5 cm. Normal echogenicity and echotexture with no masses or areas of architectural distortion. Diffuse parenchymal hyperemia by color Doppler. Normal arterial and venous waveforms.    Right epididymis: Diffusely thickened and hypoechoic, diffusely hyperemic by color Doppler. Increased echogenicity of adjacent fatty tissue.    Right hydrocele: Moderate hydrocele, complex by virtue of reticular septations within it, suspicious for hydrocele.    Right varicocele: None.      LEFT:     Left testis: 3.2 x 1.9 x 1.8 cm. Normal echogenicity and echotexture with no masses or areas of architectural distortion. Normal arterial and venous blood flow pattern.    Left epididymis: Thickening, decreased echogenicity, and hyperemia of the epididymal tail.    Left hydrocele: Small simple hydrocele.    Left varicocele: None.    IMPRESSION:     Acute right epididymoorchitis.    Mild left epididymitis confined to the epididymal tail.    < end of copied text >

## 2020-03-21 NOTE — PROGRESS NOTE ADULT - SUBJECTIVE AND OBJECTIVE BOX
afebrile    REVIEW OF SYSTEMS:  GEN: no fever,    no chills  RESP: no SOB,   no cough  CVS: no chest pain,   no palpitations  GI: no abdominal pain,   no nausea,   no vomiting,   no constipation,   no diarrhea  : no dysuria,   no frequency  NEURO: no headache,   no dizziness  PSYCH: no depression,   not anxious  Derm : no rash    MEDICATIONS  (STANDING):  aspirin enteric coated 81 milliGRAM(s) Oral daily  atorvastatin 80 milliGRAM(s) Oral at bedtime  cholecalciferol 1000 Unit(s) Oral daily  heparin  Injectable 5000 Unit(s) SubCutaneous every 8 hours  lamoTRIgine 25 milliGRAM(s) Oral daily  metoprolol tartrate 25 milliGRAM(s) Oral daily  NIFEdipine XL 30 milliGRAM(s) Oral daily  omega-3-Acid Ethyl Esters 2 Gram(s) Oral two times a day  piperacillin/tazobactam IVPB.. 3.375 Gram(s) IV Intermittent every 8 hours  tamsulosin 0.4 milliGRAM(s) Oral at bedtime    MEDICATIONS  (PRN):  acetaminophen   Tablet .. 650 milliGRAM(s) Oral every 6 hours PRN Temp greater or equal to 38C (100.4F), Mild Pain (1 - 3), Moderate Pain (4 - 6)      Vital Signs Last 24 Hrs  T(C): 36.8 (21 Mar 2020 08:12), Max: 37.2 (20 Mar 2020 19:28)  T(F): 98.3 (21 Mar 2020 08:12), Max: 98.9 (20 Mar 2020 19:28)  HR: 106 (21 Mar 2020 08:12) (80 - 106)  BP: 150/77 (21 Mar 2020 08:12) (110/64 - 150/77)  BP(mean): --  RR: 18 (21 Mar 2020 08:12) (17 - 19)  SpO2: 96% (21 Mar 2020 08:12) (95% - 98%)  CAPILLARY BLOOD GLUCOSE      POCT Blood Glucose.: 258 mg/dL (21 Mar 2020 10:07)    I&O's Summary    20 Mar 2020 07:01  -  21 Mar 2020 07:00  --------------------------------------------------------  IN: 240 mL / OUT: 200 mL / NET: 40 mL        PHYSICAL EXAM:  HEAD:  Atraumatic, Normocephalic  NECK: Supple, No   JVD  CHEST/LUNG:   no     rales,     no,    rhonchi  HEART: Regular rate and rhythm;         murmur  ABDOMEN: Soft, Nontender, ;   EXTREMITIES:    no    edema  NEUROLOGY:  alert  scrotal  swelling    LABS:                        12.0   15.82 )-----------( 242      ( 21 Mar 2020 06:38 )             38.5         137  |  102  |  22  ----------------------------<  198<H>  3.7   |  17<L>  |  1.73<H>    Ca    10.6<H>      21 Mar 2020 06:42    TPro  7.1  /  Alb  4.0  /  TBili  0.4  /  DBili  x   /  AST  13  /  ALT  10  /  AlkPhos  89  03-20    PT/INR - ( 20 Mar 2020 15:12 )   PT: 13.9 sec;   INR: 1.20 ratio         PTT - ( 20 Mar 2020 15:12 )  PTT:29.8 sec      Urinalysis Basic - ( 20 Mar 2020 18:16 )    Color: Light Yellow / Appearance: Turbid / S.022 / pH: x  Gluc: x / Ketone: Negative  / Bili: Negative / Urobili: Negative   Blood: x / Protein: 100 mg/dL / Nitrite: Negative   Leuk Esterase: Moderate / RBC: 40 /hpf / WBC >50   Sq Epi: x / Non Sq Epi: 2 / Bacteria: Negative            Hemoglobin A1C, Whole Blood: 7.6 % ( @ 09:24)            Consultant(s) Notes Reviewed:      Care Discussed with Consultants/Other Providers:

## 2020-03-21 NOTE — CONSULT NOTE ADULT - ASSESSMENT
This patient is a 68yo M with PMH of T2DM, CAD s/p stents, htn, hLd, BPH s/p chronic cho due to urinary retention who presents to the ED due to non-draining cho catheter, and swelling of scrotum. The patient noticed his cho catheter stopped draining 4 days ago. He thereafter had progressive scrotal redness, swelling and tenderness. He visited his urologist, Dr. Carlin Espinoza, who prescribed him bactrim DS for suspected infection, which he started to take without significant improvement. He denies fever, chills, but endorses some R sided lower back pain, worsened with movement. The patient denies suprapubic pain. Urine has appeared, dark and cloudy, and he suspects may have had some blood, but no gross bleeding. He complains that he has been dealing with urinary retention at least since September 2019, and is planned to have surgical evaluation in the future.      Pt reports he had his prostate biopsied in the past with benign results and underwent MRI which found a "bright spot" for which he is undergoing continued evaluation. (20 Mar 2020 20:52)  CTap shows Prostate is markedly enlarged. The bilateral epididymides are slightly hyperemic, likely corresponding to findings at sonography. Small bilateral hydroceles and scrotal edema. No subcutaneous emphysema.    Pt seen by Urology.  Cho catheter flushed at the bedside using NS and piston syringe, and found to be functioning properly. Cho draining well after being flush.  Pt on zosyn.  ID consult called for further abx managment. This patient is a 70yo M with PMH of T2DM, CAD s/p stents, htn, hLd, BPH s/p chronic cho due to urinary retention who presents to the ED due to non-draining cho catheter, and swelling of scrotum. The patient noticed his cho catheter stopped draining 4 days ago. He thereafter had progressive scrotal redness, swelling and tenderness. He visited his urologist, Dr. Carlin Espinoza, who prescribed him bactrim DS for suspected infection, which he started to take without significant improvement. He denies fever, chills, but endorses some R sided lower back pain, worsened with movement. The patient denies suprapubic pain. Urine has appeared, dark and cloudy, and he suspects may have had some blood, but no gross bleeding. He complains that he has been dealing with urinary retention at least since September 2019, and is planned to have surgical evaluation in the future.      Pt reports he had his prostate biopsied in the past with benign results and underwent MRI which found a "bright spot" for which he is undergoing continued evaluation. (20 Mar 2020 20:52)  CTap shows Prostate is markedly enlarged. The bilateral epididymides are slightly hyperemic, likely corresponding to findings at sonography. Small bilateral hydroceles and scrotal edema. No subcutaneous emphysema.    Pt seen by Urology.  Cho catheter flushed at the bedside using NS and piston syringe, and found to be functioning properly. Cho draining well after being flush.  Pt on zosyn.  ID consult called for further abx managment.     UTI/Epydido-orchitis:    - Pt with Cho catheter obstruction and scrotal swelling.  s/p replacement of cho (found to have small plug), now draining well.  Diffuse scrotal edema.  Cont abx for possible epidydoorchitis.  Ucx  with < 10K normal erick.    - f/u blood cultures    - Cont zosyn for now.  Monitor for clinical improvement, monitor WBC and temp curve.   Pt feeling better, denies f/c/suprapubic pain.     will follow,    Jing Clark  [phone]

## 2020-03-22 LAB
ANION GAP SERPL CALC-SCNC: 17 MMOL/L — SIGNIFICANT CHANGE UP (ref 5–17)
BUN SERPL-MCNC: 26 MG/DL — HIGH (ref 7–23)
CALCIUM SERPL-MCNC: 9.9 MG/DL — SIGNIFICANT CHANGE UP (ref 8.4–10.5)
CHLORIDE SERPL-SCNC: 107 MMOL/L — SIGNIFICANT CHANGE UP (ref 96–108)
CO2 SERPL-SCNC: 19 MMOL/L — LOW (ref 22–31)
CREAT SERPL-MCNC: 1.75 MG/DL — HIGH (ref 0.5–1.3)
GLUCOSE BLDC GLUCOMTR-MCNC: 134 MG/DL — HIGH (ref 70–99)
GLUCOSE BLDC GLUCOMTR-MCNC: 191 MG/DL — HIGH (ref 70–99)
GLUCOSE BLDC GLUCOMTR-MCNC: 199 MG/DL — HIGH (ref 70–99)
GLUCOSE BLDC GLUCOMTR-MCNC: 258 MG/DL — HIGH (ref 70–99)
GLUCOSE SERPL-MCNC: 159 MG/DL — HIGH (ref 70–99)
HCT VFR BLD CALC: 35.7 % — LOW (ref 39–50)
HGB BLD-MCNC: 11 G/DL — LOW (ref 13–17)
MCHC RBC-ENTMCNC: 26.7 PG — LOW (ref 27–34)
MCHC RBC-ENTMCNC: 30.8 GM/DL — LOW (ref 32–36)
MCV RBC AUTO: 86.7 FL — SIGNIFICANT CHANGE UP (ref 80–100)
NRBC # BLD: 0 /100 WBCS — SIGNIFICANT CHANGE UP (ref 0–0)
PLATELET # BLD AUTO: 234 K/UL — SIGNIFICANT CHANGE UP (ref 150–400)
POTASSIUM SERPL-MCNC: 3.8 MMOL/L — SIGNIFICANT CHANGE UP (ref 3.5–5.3)
POTASSIUM SERPL-SCNC: 3.8 MMOL/L — SIGNIFICANT CHANGE UP (ref 3.5–5.3)
RBC # BLD: 4.12 M/UL — LOW (ref 4.2–5.8)
RBC # FLD: 15.3 % — HIGH (ref 10.3–14.5)
SODIUM SERPL-SCNC: 143 MMOL/L — SIGNIFICANT CHANGE UP (ref 135–145)
WBC # BLD: 18.27 K/UL — HIGH (ref 3.8–10.5)
WBC # FLD AUTO: 18.27 K/UL — HIGH (ref 3.8–10.5)

## 2020-03-22 PROCEDURE — 99232 SBSQ HOSP IP/OBS MODERATE 35: CPT

## 2020-03-22 RX ORDER — VANCOMYCIN HCL 1 G
1000 VIAL (EA) INTRAVENOUS EVERY 24 HOURS
Refills: 0 | Status: DISCONTINUED | OUTPATIENT
Start: 2020-03-22 | End: 2020-03-23

## 2020-03-22 RX ADMIN — PIPERACILLIN AND TAZOBACTAM 25 GRAM(S): 4; .5 INJECTION, POWDER, LYOPHILIZED, FOR SOLUTION INTRAVENOUS at 05:58

## 2020-03-22 RX ADMIN — Medication 1: at 08:37

## 2020-03-22 RX ADMIN — Medication 81 MILLIGRAM(S): at 12:45

## 2020-03-22 RX ADMIN — HEPARIN SODIUM 5000 UNIT(S): 5000 INJECTION INTRAVENOUS; SUBCUTANEOUS at 12:45

## 2020-03-22 RX ADMIN — Medication 25 MILLIGRAM(S): at 05:58

## 2020-03-22 RX ADMIN — PIPERACILLIN AND TAZOBACTAM 25 GRAM(S): 4; .5 INJECTION, POWDER, LYOPHILIZED, FOR SOLUTION INTRAVENOUS at 12:51

## 2020-03-22 RX ADMIN — Medication 250 MILLIGRAM(S): at 17:51

## 2020-03-22 RX ADMIN — Medication 30 MILLIGRAM(S): at 05:58

## 2020-03-22 RX ADMIN — Medication 3: at 12:45

## 2020-03-22 RX ADMIN — ATORVASTATIN CALCIUM 80 MILLIGRAM(S): 80 TABLET, FILM COATED ORAL at 21:55

## 2020-03-22 RX ADMIN — PIPERACILLIN AND TAZOBACTAM 25 GRAM(S): 4; .5 INJECTION, POWDER, LYOPHILIZED, FOR SOLUTION INTRAVENOUS at 21:53

## 2020-03-22 RX ADMIN — TAMSULOSIN HYDROCHLORIDE 0.4 MILLIGRAM(S): 0.4 CAPSULE ORAL at 21:56

## 2020-03-22 RX ADMIN — LAMOTRIGINE 25 MILLIGRAM(S): 25 TABLET, ORALLY DISINTEGRATING ORAL at 21:55

## 2020-03-22 RX ADMIN — HEPARIN SODIUM 5000 UNIT(S): 5000 INJECTION INTRAVENOUS; SUBCUTANEOUS at 21:55

## 2020-03-22 RX ADMIN — HEPARIN SODIUM 5000 UNIT(S): 5000 INJECTION INTRAVENOUS; SUBCUTANEOUS at 05:59

## 2020-03-22 RX ADMIN — Medication 2 GRAM(S): at 05:58

## 2020-03-22 RX ADMIN — Medication 2 GRAM(S): at 17:52

## 2020-03-22 RX ADMIN — Medication 1000 UNIT(S): at 12:45

## 2020-03-22 NOTE — PROGRESS NOTE ADULT - ASSESSMENT
70yo M with     T2DM, CAD s/p stents, 10  yrs  ago, , , htn, hLd   , BPH s/p chronic cho due to urinary retention   HTN.  DM 2.       admitted with   non-draining cho catheter   , and swelling of scrotum,   c/w   b/l   epididymo-orchitis and ZIA.  on  zosyn,  swelling./ redness  is  better  pt  feels better   ID dr villanueva/  pt has elevated  wbc, 18,000  creatinine  stable    dvt  ppx     < from: CT Pelvis w/ IV Cont (03.20.20 @ 17:54) >  MPRESSION:  No subcutaneous emphysema. 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  Small bilateral hydroceles and diffuse scrotal edema.  Marked enlargement of the prostate.  < end of copied text >       < from: US Doppler Scrotum (03.20.20 @ 16:51) >  IMPRESSION:   Acute rght epididymoorchitis.  Mild left epididymitis confined to the epididymal tail  < end of copied text >

## 2020-03-22 NOTE — CHART NOTE - NSCHARTNOTEFT_GEN_A_CORE
WBC trending upwards despite treatment with broad spectrum abx.  Currently on zosyn.  Pt with no reported diarrhea (constipation documented on sunrise).  Pt with cho related UTI and epidymitis.  UA (+), Ucx < 10K organisms, s/p cho change.  Will cover for MRSA given cho catheter.  Add vancomycin and monitor for clinical response.  Repeat CBC with diff in AM.  Monitor for new fever.     Will follow,    Jing Clark  [phone]

## 2020-03-22 NOTE — PROGRESS NOTE ADULT - SUBJECTIVE AND OBJECTIVE BOX
afebrile/  feels better  REVIEW OF SYSTEMS:  GEN: no fever,    no chills  RESP: no SOB,   no cough  CVS: no chest pain,   no palpitations  GI: no abdominal pain,   no nausea,   no vomiting,   no constipation,   no diarrhea  : no dysuria,   no frequency  NEURO: no headache,   no dizziness  PSYCH: no depression,   not anxious  Derm : no rash    MEDICATIONS  (STANDING):  aspirin enteric coated 81 milliGRAM(s) Oral daily  atorvastatin 80 milliGRAM(s) Oral at bedtime  cholecalciferol 1000 Unit(s) Oral daily  dextrose 5%. 1000 milliLiter(s) (50 mL/Hr) IV Continuous <Continuous>  dextrose 50% Injectable 12.5 Gram(s) IV Push once  dextrose 50% Injectable 25 Gram(s) IV Push once  dextrose 50% Injectable 25 Gram(s) IV Push once  heparin  Injectable 5000 Unit(s) SubCutaneous every 8 hours  insulin lispro (HumaLOG) corrective regimen sliding scale   SubCutaneous three times a day before meals  insulin lispro (HumaLOG) corrective regimen sliding scale   SubCutaneous at bedtime  lamoTRIgine 25 milliGRAM(s) Oral daily  metoprolol tartrate 25 milliGRAM(s) Oral daily  NIFEdipine XL 30 milliGRAM(s) Oral daily  omega-3-Acid Ethyl Esters 2 Gram(s) Oral two times a day  piperacillin/tazobactam IVPB.. 3.375 Gram(s) IV Intermittent every 8 hours  tamsulosin 0.4 milliGRAM(s) Oral at bedtime    MEDICATIONS  (PRN):  acetaminophen   Tablet .. 650 milliGRAM(s) Oral every 6 hours PRN Temp greater or equal to 38C (100.4F), Mild Pain (1 - 3), Moderate Pain (4 - 6)  dextrose 40% Gel 15 Gram(s) Oral once PRN Blood Glucose LESS THAN 70 milliGRAM(s)/deciliter  glucagon  Injectable 1 milliGRAM(s) IntraMuscular once PRN Glucose LESS THAN 70 milligrams/deciliter      Vital Signs Last 24 Hrs  T(C): 37.1 (21 Mar 2020 23:35), Max: 37.1 (21 Mar 2020 23:35)  T(F): 98.7 (21 Mar 2020 23:35), Max: 98.7 (21 Mar 2020 23:35)  HR: 84 (22 Mar 2020 05:30) (75 - 92)  BP: 142/77 (22 Mar 2020 05:30) (142/77 - 154/82)  BP(mean): --  RR: 17 (21 Mar 2020 23:35) (17 - 18)  SpO2: 97% (21 Mar 2020 23:35) (96% - 97%)  CAPILLARY BLOOD GLUCOSE      POCT Blood Glucose.: 199 mg/dL (22 Mar 2020 08:30)  POCT Blood Glucose.: 170 mg/dL (21 Mar 2020 21:21)  POCT Blood Glucose.: 172 mg/dL (21 Mar 2020 18:17)  POCT Blood Glucose.: 258 mg/dL (21 Mar 2020 10:07)    I&O's Summary    21 Mar 2020 07:01  -  22 Mar 2020 07:00  --------------------------------------------------------  IN: 780 mL / OUT: 2650 mL / NET: -1870 mL        PHYSICAL EXAM:  HEAD:  Atraumatic, Normocephalic  NECK: Supple, No   JVD  CHEST/LUNG:   no     rales,     no,    rhonchi  HEART: Regular rate and rhythm;         murmur  ABDOMEN: Soft, Nontender, ;   EXTREMITIES:      less scrotal   edema  NEUROLOGY:  alert    LABS:                        11.0   18.27 )-----------( 234      ( 22 Mar 2020 07:07 )             35.7     03-22    143  |  107  |  26<H>  ----------------------------<  159<H>  3.8   |  19<L>  |  1.75<H>    Ca    9.9      22 Mar 2020 07:07    TPro  7.1  /  Alb  4.0  /  TBili  0.4  /  DBili  x   /  AST  13  /  ALT  10  /  AlkPhos  89  03-20    PT/INR - ( 20 Mar 2020 15:12 )   PT: 13.9 sec;   INR: 1.20 ratio         PTT - ( 20 Mar 2020 15:12 )  PTT:29.8 sec      Urinalysis Basic - ( 20 Mar 2020 18:16 )    Color: Light Yellow / Appearance: Turbid / S.022 / pH: x  Gluc: x / Ketone: Negative  / Bili: Negative / Urobili: Negative   Blood: x / Protein: 100 mg/dL / Nitrite: Negative   Leuk Esterase: Moderate / RBC: 40 /hpf / WBC >50   Sq Epi: x / Non Sq Epi: 2 / Bacteria: Negative            Hemoglobin A1C, Whole Blood: 7.6 % ( @ 09:24)            Consultant(s) Notes Reviewed:      Care Discussed with Consultants/Other Providers:

## 2020-03-22 NOTE — CHART NOTE - NSCHARTNOTEFT_GEN_A_CORE
Case discussed with Dr. Stockton given unavailability of Dr. Espinoza at this time  ID following, appreciate recs  No urgent urological intervention  Please call private urologists office with any further questions

## 2020-03-23 ENCOUNTER — TRANSCRIPTION ENCOUNTER (OUTPATIENT)
Age: 70
End: 2020-03-23

## 2020-03-23 VITALS
SYSTOLIC BLOOD PRESSURE: 136 MMHG | RESPIRATION RATE: 18 BRPM | HEART RATE: 69 BPM | TEMPERATURE: 98 F | DIASTOLIC BLOOD PRESSURE: 85 MMHG | OXYGEN SATURATION: 95 %

## 2020-03-23 LAB
ANION GAP SERPL CALC-SCNC: 13 MMOL/L — SIGNIFICANT CHANGE UP (ref 5–17)
BUN SERPL-MCNC: 21 MG/DL — SIGNIFICANT CHANGE UP (ref 7–23)
CALCIUM SERPL-MCNC: 9.8 MG/DL — SIGNIFICANT CHANGE UP (ref 8.4–10.5)
CHLORIDE SERPL-SCNC: 105 MMOL/L — SIGNIFICANT CHANGE UP (ref 96–108)
CO2 SERPL-SCNC: 21 MMOL/L — LOW (ref 22–31)
CREAT SERPL-MCNC: 1.59 MG/DL — HIGH (ref 0.5–1.3)
GLUCOSE BLDC GLUCOMTR-MCNC: 168 MG/DL — HIGH (ref 70–99)
GLUCOSE BLDC GLUCOMTR-MCNC: 201 MG/DL — HIGH (ref 70–99)
GLUCOSE SERPL-MCNC: 180 MG/DL — HIGH (ref 70–99)
HCT VFR BLD CALC: 36.2 % — LOW (ref 39–50)
HGB BLD-MCNC: 11 G/DL — LOW (ref 13–17)
MCHC RBC-ENTMCNC: 26.8 PG — LOW (ref 27–34)
MCHC RBC-ENTMCNC: 30.4 GM/DL — LOW (ref 32–36)
MCV RBC AUTO: 88.1 FL — SIGNIFICANT CHANGE UP (ref 80–100)
NRBC # BLD: 0 /100 WBCS — SIGNIFICANT CHANGE UP (ref 0–0)
PLATELET # BLD AUTO: 251 K/UL — SIGNIFICANT CHANGE UP (ref 150–400)
POTASSIUM SERPL-MCNC: 3.8 MMOL/L — SIGNIFICANT CHANGE UP (ref 3.5–5.3)
POTASSIUM SERPL-SCNC: 3.8 MMOL/L — SIGNIFICANT CHANGE UP (ref 3.5–5.3)
RBC # BLD: 4.11 M/UL — LOW (ref 4.2–5.8)
RBC # FLD: 15.5 % — HIGH (ref 10.3–14.5)
SODIUM SERPL-SCNC: 139 MMOL/L — SIGNIFICANT CHANGE UP (ref 135–145)
WBC # BLD: 15.04 K/UL — HIGH (ref 3.8–10.5)
WBC # FLD AUTO: 15.04 K/UL — HIGH (ref 3.8–10.5)

## 2020-03-23 PROCEDURE — 81001 URINALYSIS AUTO W/SCOPE: CPT

## 2020-03-23 PROCEDURE — 85652 RBC SED RATE AUTOMATED: CPT

## 2020-03-23 PROCEDURE — 96375 TX/PRO/DX INJ NEW DRUG ADDON: CPT | Mod: XU

## 2020-03-23 PROCEDURE — 76870 US EXAM SCROTUM: CPT

## 2020-03-23 PROCEDURE — 86850 RBC ANTIBODY SCREEN: CPT

## 2020-03-23 PROCEDURE — 82962 GLUCOSE BLOOD TEST: CPT

## 2020-03-23 PROCEDURE — 86901 BLOOD TYPING SEROLOGIC RH(D): CPT

## 2020-03-23 PROCEDURE — 72193 CT PELVIS W/DYE: CPT

## 2020-03-23 PROCEDURE — 86803 HEPATITIS C AB TEST: CPT

## 2020-03-23 PROCEDURE — 99285 EMERGENCY DEPT VISIT HI MDM: CPT | Mod: 25

## 2020-03-23 PROCEDURE — 93975 VASCULAR STUDY: CPT

## 2020-03-23 PROCEDURE — 85610 PROTHROMBIN TIME: CPT

## 2020-03-23 PROCEDURE — 86900 BLOOD TYPING SEROLOGIC ABO: CPT

## 2020-03-23 PROCEDURE — 85730 THROMBOPLASTIN TIME PARTIAL: CPT

## 2020-03-23 PROCEDURE — 80048 BASIC METABOLIC PNL TOTAL CA: CPT

## 2020-03-23 PROCEDURE — 85027 COMPLETE CBC AUTOMATED: CPT

## 2020-03-23 PROCEDURE — 80053 COMPREHEN METABOLIC PANEL: CPT

## 2020-03-23 PROCEDURE — 51702 INSERT TEMP BLADDER CATH: CPT

## 2020-03-23 PROCEDURE — 83036 HEMOGLOBIN GLYCOSYLATED A1C: CPT

## 2020-03-23 PROCEDURE — 87086 URINE CULTURE/COLONY COUNT: CPT

## 2020-03-23 PROCEDURE — 86140 C-REACTIVE PROTEIN: CPT

## 2020-03-23 PROCEDURE — 96365 THER/PROPH/DIAG IV INF INIT: CPT | Mod: XU

## 2020-03-23 PROCEDURE — 99238 HOSP IP/OBS DSCHRG MGMT 30/<: CPT

## 2020-03-23 PROCEDURE — 87040 BLOOD CULTURE FOR BACTERIA: CPT

## 2020-03-23 RX ORDER — NIFEDIPINE 30 MG
1 TABLET, EXTENDED RELEASE 24 HR ORAL
Qty: 0 | Refills: 0 | DISCHARGE
Start: 2020-03-23

## 2020-03-23 RX ADMIN — HEPARIN SODIUM 5000 UNIT(S): 5000 INJECTION INTRAVENOUS; SUBCUTANEOUS at 05:43

## 2020-03-23 RX ADMIN — Medication 25 MILLIGRAM(S): at 05:43

## 2020-03-23 RX ADMIN — PIPERACILLIN AND TAZOBACTAM 25 GRAM(S): 4; .5 INJECTION, POWDER, LYOPHILIZED, FOR SOLUTION INTRAVENOUS at 05:43

## 2020-03-23 RX ADMIN — Medication 2: at 08:51

## 2020-03-23 RX ADMIN — Medication 2 GRAM(S): at 05:43

## 2020-03-23 RX ADMIN — Medication 1: at 13:42

## 2020-03-23 RX ADMIN — Medication 81 MILLIGRAM(S): at 11:30

## 2020-03-23 RX ADMIN — Medication 1000 UNIT(S): at 11:29

## 2020-03-23 RX ADMIN — HEPARIN SODIUM 5000 UNIT(S): 5000 INJECTION INTRAVENOUS; SUBCUTANEOUS at 13:42

## 2020-03-23 RX ADMIN — Medication 30 MILLIGRAM(S): at 05:43

## 2020-03-23 NOTE — DISCHARGE NOTE PROVIDER - NSDCCPCAREPLAN_GEN_ALL_CORE_FT
PRINCIPAL DISCHARGE DIAGNOSIS  Diagnosis: Epididymo-orchitis, acute  Assessment and Plan of Treatment: S/P intravenous antibiotics, patient clinically improved and antibiotics was changed to oral antibiotics. please complete antibiotics as indicated. Pt with Cho catheter obstruction and scrotal swelling.  s/p replacement of cho (found to have small plug), now draining well.  Diffuse scrotal edema.  Cont abx for possible epidydoorchitis.  Ucx  with < 10K normal erick.        SECONDARY DISCHARGE DIAGNOSES  Diagnosis: T2DM (type 2 diabetes mellitus)  Assessment and Plan of Treatment: Your next appointment with endocrinologist ([facility] endocrine ph: [phone])/PMD is -   HgA1C this admission -  Make sure you get your HgA1c checked every three months.  If you take oral diabetes medications, check your blood glucose two times a day.  If you take insulin, check your blood glucose before meals and at bedtime.  It's important not to skip any meals.  Keep a log of your blood glucose results and always take it with you to your doctor appointments.  Keep a list of your current medications including injectables and over the counter medications and bring this medication list with you to all your doctor appointments.  If you have not seen your ophthalmologist this year call for appointment.  Check your feet daily for redness, sores, or openings. Do not self treat. If no improvement in two days call your primary care physician for an appointment.  Low blood sugar (hypoglycemia) is a blood sugar below 70mg/dl. Check your blood sugar if you feel signs/symptoms of hypoglycemia. If your blood sugar is below 70 take 15 grams of carbohydrates (ex 4 oz of apple juice, 3-4 glucose tablets, or 4-6 oz of regular soda) wait 15 minutes and repeat blood sugar to make sure it comes up above 70.  If your blood sugar is above 70 and you are due for a meal, have a meal.  If you are not due for a meal have a snack.  This snack helps keeps your blood sugar at a safe range.      Diagnosis: HTN (hypertension)  Assessment and Plan of Treatment: Follow up with your medical doctor to establish long term blood pressure treatment goals.      Diagnosis: CAD (coronary artery disease)  Assessment and Plan of Treatment: Coronary artery disease is a condition where the arteries the supply the heart muscle get clogges with fatty deposits & puts you at risk for a heart attack  Call your doctor if you have any new pain, pressure, or discomfort in the center of your chest, pain, tingling or discomfort in arms, back, neck, jaw, or stomach, shortness of breath, nausea, vomiting, burping or heartburn, sweating, cold and clammy skin, racing or abnormal heartbeat for more than 10 minutes or if they keep coming & going.  Call 911 and do not tr to get to hospital by care  You can help yourself with lefestyle changes (quitting smoking if you smoke), eat lots of fruits & vegetables & low fat dairy products, not a lot of meat & fatty foods, walk or some form of physical activity most days of the week, lose weight if you are overweight  Take your cardiac medication as prescribed to lower cholesterol, to lower blood pressure, aspirin to prevent blood clots, and diabetes control  Make sure to keep appointments with doctor for cardiac follow up care

## 2020-03-23 NOTE — DISCHARGE NOTE PROVIDER - CARE PROVIDER_API CALL
Carlin Espinoza)  Urology  [address], [address]  [place] [address]  Phone: [phone]  Fax: [phone]  Established Patient  Follow Up Time: 1-3 days

## 2020-03-23 NOTE — DISCHARGE NOTE PROVIDER - NSDCMRMEDTOKEN_GEN_ALL_CORE_FT
aspirin 81 mg oral tablet: 1 tab(s) orally once a day  doxycycline monohydrate 100 mg oral capsule: 1 cap(s) orally every 12 hours  Fish Oil oral capsule: 1 cap(s) orally 2 times a day  Flomax 0.4 mg oral capsule: 1 cap(s) orally once a day  lamoTRIgine 25 mg oral tablet: 1 tab(s) orally once a day  levoFLOXacin 250 mg oral tablet: 1 tab(s) orally every 24 hours  Lipitor 80 mg oral tablet: 1 tab(s) orally once a day  Metoprolol Tartrate 25 mg oral tablet: 1 tab(s) orally once a day  NIFEdipine 30 mg oral tablet, extended release: 1 tab(s) orally once a day  NIFEdipine 30 mg oral tablet, extended release: 1 tab(s) orally once a day  Vitamin D3: 1 cap(s) orally once a day

## 2020-03-23 NOTE — DISCHARGE NOTE NURSING/CASE MANAGEMENT/SOCIAL WORK - PATIENT PORTAL LINK FT
You can access the FollowMyHealth Patient Portal offered by [facility] by registering at the following website: [email or web address]. By joining [facility] FollowMyHealth portal, you will also be able to view your health information using other applications (apps) compatible with our system.

## 2020-03-23 NOTE — DISCHARGE NOTE PROVIDER - HOSPITAL COURSE
This patient is a 68yo M with PMH of T2DM, CAD s/p stents, htn, hLd, BPH s/p chronic cho due to urinary retention who presents to the ED due to non-draining cho catheter, and swelling of scrotum. The patient noticed his cho catheter stopped draining 4 days ago. He thereafter had progressive scrotal redness, swelling and tenderness. He visited his urologist, Dr. Carlin Espinoza, who prescribed him bactrim DS for suspected infection, which he started to take without significant improvement. He denies fever, chills, but endorses some R sided lower back pain, worsened with movement. The patient denies suprapubic pain. Urine has appeared, dark and cloudy, and he suspects may have had some blood, but no gross bleeding. He complains that he has been dealing with urinary retention at least since September 2019, and is planned to have surgical evaluation in the future.          Pt reports he had his prostate biopsied in the past with benign results and underwent MRI which found a "bright spot" for which he is undergoing continued evaluation. (20 Mar 2020 20:52)    CTap shows Prostate is markedly enlarged. The bilateral epididymides are slightly hyperemic, likely corresponding to findings at sonography. Small bilateral hydroceles and scrotal edema. No subcutaneous emphysema.        Pt seen by Urology.  Cho catheter flushed at the bedside using NS and piston syringe, and found to be functioning properly. Cho draining well after being flush.  Pt on zosyn.  ID consult called for further abx managment.         UTI/Epydido-orchitis:        - Pt with Cho catheter obstruction and scrotal swelling.  s/p replacement of cho (found to have small plug), now draining well.  Diffuse scrotal edema.  Cont abx for possible epidydoorchitis.  Ucx  with < 10K normal erick.        - f/u blood cultures - ngtd        - Vanco added yesterday due to rising WBC.  Also on zosyn.  WBC improved today.  No fever, chills, abd pain or flank pain.  Pt clinically improving.  Change to PO levaquin 250mg qdaily and doxycycline 100mg po bid x 7 more days. Patient to follow up with private urology outpatient. 70 yo male PMH of DM(II), CAD s/p stents, HTN, HLD, BPH s/p chronic cho due to urinary retention who presents to the ED due to non-draining cho catheter, and swelling of scrotum. The patient noticed his cho catheter stopped draining 4 days ago. He thereafter had progressive scrotal redness, swelling and tenderness. He visited his urologist, Dr. Carlin Espinoza, who prescribed him bactrim DS for suspected infection, which he started to take without significant improvement. He denies fever, chills, but endorses some R sided lower back pain, worsened with movement. The patient denies suprapubic pain. Urine has appeared, dark and cloudy, and he suspects may have had some blood, but no gross bleeding. He complains that he has been dealing with urinary retention at least since September 2019, and is planned to have surgical evaluation in the future.  Reports he had his prostate biopsied in the past with benign results and underwent MRI which found a "bright spot" for which he is undergoing continued evaluation. CT abdomen and pelvis shows prostate is markedly enlarged. The bilateral epididymides are slightly hyperemic, likely corresponding to findings at sonography. Small bilateral hydroceles and scrotal edema. No subcutaneous emphysema.  Creatinine better at discharge 1.50 from 1.94 on arrival. Likely had ZIA on arrival from uretheral obstruction. CBC all normal.          Pt seen by Urology.  Cho catheter flushed at the bedside using NS and piston syringe, and found to be functioning properly. Cho draining well after being flush.  Pt on Zosyn.  ID consult followed. s/p replacement of cho (found to have small plug), now draining well.  Diffuse scrotal edema.  Cont abx for possible epidydoorchitis.  Ucx  with < 10K normal erick. Discharged to home. Blood cultures negative. WBC improved.  Pt clinically improving.  Change to PO levaquin 250 mg daily and doxycycline 100mg po bid x 7 more days. Patient to follow up with private urology outpatient. See attached med list. 70 yo male PMH of DM(II), CAD s/p stents, HTN, HLD, BPH s/p chronic cho due to urinary retention who presents to the ED due to non-draining cho catheter, and swelling of scrotum. The patient noticed his cho catheter stopped draining 4 days ago. He thereafter had progressive scrotal redness, swelling and tenderness. He visited his urologist, Dr. Carlin Espinoza, who prescribed him bactrim DS for suspected infection, which he started to take without significant improvement. He denies fever, chills, but endorses some R sided lower back pain, worsened with movement. The patient denies suprapubic pain. Urine has appeared, dark and cloudy, and he suspects may have had some blood, but no gross bleeding. He complains that he has been dealing with urinary retention at least since September 2019, and is planned to have surgical evaluation in the future.  Reports he had his prostate biopsied in the past with benign results and underwent MRI which found a "bright spot" for which he is undergoing continued evaluation. CT abdomen and pelvis shows prostate is markedly enlarged. The bilateral epididymides are slightly hyperemic, likely corresponding to findings at sonography. Small bilateral hydroceles and scrotal edema. No subcutaneous emphysema.  Creatinine better at discharge 1.50 from 1.94 on arrival. Likely had ZIA on arrival from uretheral obstruction. CBC all normal.          Pt seen by Urology.  Cho catheter flushed at the bedside using NS and piston syringe, and found to be functioning properly. Cho draining well after being flush.  Pt on Zosyn.  ID consult followed. s/p replacement of cho (found to have small plug), now draining well.  Diffuse scrotal edema.  Cont abx for possible epidydoorchitis.  Ucx  with < 10K normal erick.         Discharged to home. Blood cultures negative. WBC improved.  Pt clinically improving.  Change to PO levaquin 250 mg daily and doxycycline 100mg po bid x 7 more days. Patient to follow up with private urology outpatient. See attached med list.

## 2020-03-23 NOTE — PROGRESS NOTE ADULT - SUBJECTIVE AND OBJECTIVE BOX
afbreile  REVIEW OF SYSTEMS:  GEN: no fever,    no chills  RESP: no SOB,   no cough  CVS: no chest pain,   no palpitations  GI: no abdominal pain,   no nausea,   no vomiting,   no constipation,   no diarrhea  : no dysuria,   no frequency  NEURO: no headache,   no dizziness  PSYCH: no depression,   not anxious  Derm : no rash    MEDICATIONS  (STANDING):  aspirin enteric coated 81 milliGRAM(s) Oral daily  atorvastatin 80 milliGRAM(s) Oral at bedtime  cholecalciferol 1000 Unit(s) Oral daily  dextrose 5%. 1000 milliLiter(s) (50 mL/Hr) IV Continuous <Continuous>  dextrose 50% Injectable 12.5 Gram(s) IV Push once  dextrose 50% Injectable 25 Gram(s) IV Push once  dextrose 50% Injectable 25 Gram(s) IV Push once  heparin  Injectable 5000 Unit(s) SubCutaneous every 8 hours  insulin lispro (HumaLOG) corrective regimen sliding scale   SubCutaneous three times a day before meals  insulin lispro (HumaLOG) corrective regimen sliding scale   SubCutaneous at bedtime  lamoTRIgine 25 milliGRAM(s) Oral daily  metoprolol tartrate 25 milliGRAM(s) Oral daily  NIFEdipine XL 30 milliGRAM(s) Oral daily  omega-3-Acid Ethyl Esters 2 Gram(s) Oral two times a day  piperacillin/tazobactam IVPB.. 3.375 Gram(s) IV Intermittent every 8 hours  tamsulosin 0.4 milliGRAM(s) Oral at bedtime  vancomycin  IVPB 1000 milliGRAM(s) IV Intermittent every 24 hours    MEDICATIONS  (PRN):  acetaminophen   Tablet .. 650 milliGRAM(s) Oral every 6 hours PRN Temp greater or equal to 38C (100.4F), Mild Pain (1 - 3), Moderate Pain (4 - 6)  dextrose 40% Gel 15 Gram(s) Oral once PRN Blood Glucose LESS THAN 70 milliGRAM(s)/deciliter  glucagon  Injectable 1 milliGRAM(s) IntraMuscular once PRN Glucose LESS THAN 70 milligrams/deciliter      Vital Signs Last 24 Hrs  T(C): 36.8 (23 Mar 2020 08:39), Max: 37.5 (22 Mar 2020 16:32)  T(F): 98.2 (23 Mar 2020 08:39), Max: 99.5 (22 Mar 2020 16:32)  HR: 69 (23 Mar 2020 08:39) (69 - 99)  BP: 136/85 (23 Mar 2020 08:39) (115/73 - 136/85)  BP(mean): --  RR: 18 (23 Mar 2020 08:39) (18 - 18)  SpO2: 95% (23 Mar 2020 08:39) (95% - 99%)  CAPILLARY BLOOD GLUCOSE      POCT Blood Glucose.: 201 mg/dL (23 Mar 2020 08:15)  POCT Blood Glucose.: 191 mg/dL (22 Mar 2020 21:59)  POCT Blood Glucose.: 134 mg/dL (22 Mar 2020 18:02)  POCT Blood Glucose.: 258 mg/dL (22 Mar 2020 12:25)    I&O's Summary    22 Mar 2020 07:01  -  23 Mar 2020 07:00  --------------------------------------------------------  IN: 600 mL / OUT: 2100 mL / NET: -1500 mL        PHYSICAL EXAM:  HEAD:  Atraumatic, Normocephalic  NECK: Supple, No   JVD  CHEST/LUNG:   no     rales,     no,    rhonchi  HEART: Regular rate and rhythm;         murmur  ABDOMEN: Soft, Nontender, ;   EXTREMITIES:   no     edema  NEUROLOGY:  alert  less  scrotal  swelling    LABS:                        11.0   15.04 )-----------( 251      ( 23 Mar 2020 07:32 )             36.2     03-23    139  |  105  |  21  ----------------------------<  180<H>  3.8   |  21<L>  |  1.59<H>    Ca    9.8      23 Mar 2020 07:32                    Hemoglobin A1C, Whole Blood: 7.6 % (03-21 @ 09:24)            Consultant(s) Notes Reviewed:      Care Discussed with Consultants/Other Providers:

## 2020-03-23 NOTE — PROGRESS NOTE ADULT - SUBJECTIVE AND OBJECTIVE BOX
Infectious Diseases progress note:    Subjective:  NAD, feeling better.  No fever, chills, abd pain or flank pain    ROS:  CONSTITUTIONAL:  No fever, chills, rigors  CARDIOVASCULAR:  No chest pain or palpitations  RESPIRATORY:   No SOB, cough, dyspnea on exertion.  No wheezing  GASTROINTESTINAL:  No abd pain, N/V, diarrhea/constipation  EXTREMITIES:  No swelling or joint pain  GENITOURINARY:  No burning on urination, increased frequency or urgency.  No flank pain  NEUROLOGIC:  No HA, visual disturbances  SKIN: No rashes    Allergies    No Known Allergies    Intolerances        ANTIBIOTICS/RELEVANT:  antimicrobials  doxycycline hyclate Capsule 100 milliGRAM(s) Oral every 12 hours  levoFLOXacin  Tablet 250 milliGRAM(s) Oral every 24 hours    immunologic:    OTHER:  acetaminophen   Tablet .. 650 milliGRAM(s) Oral every 6 hours PRN  aspirin enteric coated 81 milliGRAM(s) Oral daily  atorvastatin 80 milliGRAM(s) Oral at bedtime  cholecalciferol 1000 Unit(s) Oral daily  dextrose 40% Gel 15 Gram(s) Oral once PRN  dextrose 5%. 1000 milliLiter(s) IV Continuous <Continuous>  dextrose 50% Injectable 12.5 Gram(s) IV Push once  dextrose 50% Injectable 25 Gram(s) IV Push once  dextrose 50% Injectable 25 Gram(s) IV Push once  glucagon  Injectable 1 milliGRAM(s) IntraMuscular once PRN  heparin  Injectable 5000 Unit(s) SubCutaneous every 8 hours  insulin lispro (HumaLOG) corrective regimen sliding scale   SubCutaneous three times a day before meals  insulin lispro (HumaLOG) corrective regimen sliding scale   SubCutaneous at bedtime  lamoTRIgine 25 milliGRAM(s) Oral daily  metoprolol tartrate 25 milliGRAM(s) Oral daily  NIFEdipine XL 30 milliGRAM(s) Oral daily  omega-3-Acid Ethyl Esters 2 Gram(s) Oral two times a day  tamsulosin 0.4 milliGRAM(s) Oral at bedtime      Objective:  Vital Signs Last 24 Hrs  T(C): 36.8 (23 Mar 2020 08:39), Max: 37.5 (22 Mar 2020 16:32)  T(F): 98.2 (23 Mar 2020 08:39), Max: 99.5 (22 Mar 2020 16:32)  HR: 69 (23 Mar 2020 08:39) (69 - 99)  BP: 136/85 (23 Mar 2020 08:39) (115/73 - 136/85)  BP(mean): --  RR: 18 (23 Mar 2020 08:39) (18 - 18)  SpO2: 95% (23 Mar 2020 08:39) (95% - 99%)    PHYSICAL EXAM:  Constitutional:NAD  Eyes:BLAKE, EOMI  Ear/Nose/Throat: no thrush, mucositis.  Moist mucous membranes	  Neck:no JVD, no lymphadenopathy, supple  Respiratory: CTA jairon  Cardiovascular: S1S2 RRR, no murmurs  Gastrointestinal:soft, nontender,  nondistended (+) BS  Extremities:no e/e/c  Skin:  no rashes, open wounds or ulcerations  :  Davies draining clear yellow urine        LABS:                        11.0   15.04 )-----------( 251      ( 23 Mar 2020 07:32 )             36.2     03-23    139  |  105  |  21  ----------------------------<  180<H>  3.8   |  21<L>  |  1.59<H>    Ca    9.8      23 Mar 2020 07:32              MICROBIOLOGY:    Culture - Blood in AM (03.21.20 @ 08:48)    Specimen Source: .Blood Blood-Venous    Culture Results:   No growth to date.    Culture - Blood in AM (03.21.20 @ 08:48)    Specimen Source: .Blood Blood-Peripheral    Culture Results:   No growth to date.    Culture - Urine (03.20.20 @ 21:53)    Specimen Source: .Urine Clean Catch (Midstream)    Culture Results:   <10,000 CFU/mL Normal Urogenital Teri          RADIOLOGY & ADDITIONAL STUDIES:    < from: CT Pelvis w/ IV Cont (03.20.20 @ 17:54) >  FINDINGS:    BLADDER: Collapsed around Davies catheter with a small amount of air.  REPRODUCTIVE ORGANS: Prostate is markedly enlarged. The bilateral epididymides are slightly hyperemic, likely corresponding to findings at sonography. Small bilateral hydroceles and scrotal edema. No subcutaneous emphysema.  LYMPH NODES: No pelvic lymphadenopathy.    VISUALIZED PORTIONS:    ABDOMINAL ORGANS: Within normal limits.  BOWEL: Scattered colonic diverticula. The appendix is normal.  PERITONEUM: No ascites.  VESSELS: Atherosclerotic changes.  ABDOMINAL WALL: Fat-containing ventral hernia.  BONES: Degenerative changes.    IMPRESSION:     No subcutaneous emphysema. 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    Small bilateral hydroceles and diffuse scrotal edema.    Marked enlargement of the prostate.    < end of copied text >

## 2020-03-23 NOTE — PROGRESS NOTE ADULT - ASSESSMENT
70yo M with     T2DM, CAD s/p stents, 10  yrs  ago, , , htn, hLd   , BPH s/p chronic cho due to urinary retention   HTN.  DM 2.       admitted with   non-draining cho catheter   , and swelling of scrotum,   c/w   b/l   epididymo-orchitis and ZIA.  on  zosyn,  swelling./ redness  is  better  pt  feels better   ID dr villanueva/  wbc, 15,000  creatinine  decreased  to  1.5/  await duration  from ID/  NO INTREVENTION PE R OROLOGY    dvt  ppx     < from: CT Pelvis w/ IV Cont (03.20.20 @ 17:54) >  MPRESSION:  No subcutaneous emphysema. 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  Small bilateral hydroceles and diffuse scrotal edema.  Marked enlargement of the prostate.  < end of copied text >       < from: US Doppler Scrotum (03.20.20 @ 16:51) >  IMPRESSION:   Acute rght epididymoorchitis.  Mild left epididymitis confined to the epididymal tail  < end of copied text > 68yo M with     T2DM, CAD s/p stents, 10  yrs  ago, , , htn, hLd   , BPH s/p chronic cho due to urinary retention   HTN.  DM 2.       admitted with   non-draining cho catheter   , and swelling of scrotum,   c/w   b/l   epididymo-orchitis and ZIA.  on  zosyn,  swelling./ redness  is  better  pt  feels better   ID dr villanueva/  wbc, 15,000  creatinine  decreased  to  1.5/ d/c on doxy/ Levaquin pe r ID/   no intervention pe r uro    dvt  ppx     < from: CT Pelvis w/ IV Cont (03.20.20 @ 17:54) >  MPRESSION:  No subcutaneous emphysema. 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  Small bilateral hydroceles and diffuse scrotal edema.  Marked enlargement of the prostate.  < end of copied text >       < from: US Doppler Scrotum (03.20.20 @ 16:51) >  IMPRESSION:   Acute rght epididymoorchitis.  Mild left epididymitis confined to the epididymal tail  < end of copied text >

## 2020-03-23 NOTE — PROGRESS NOTE ADULT - ASSESSMENT
This patient is a 70yo M with PMH of T2DM, CAD s/p stents, htn, hLd, BPH s/p chronic cho due to urinary retention who presents to the ED due to non-draining cho catheter, and swelling of scrotum. The patient noticed his cho catheter stopped draining 4 days ago. He thereafter had progressive scrotal redness, swelling and tenderness. He visited his urologist, Dr. Carlin Espinoza, who prescribed him bactrim DS for suspected infection, which he started to take without significant improvement. He denies fever, chills, but endorses some R sided lower back pain, worsened with movement. The patient denies suprapubic pain. Urine has appeared, dark and cloudy, and he suspects may have had some blood, but no gross bleeding. He complains that he has been dealing with urinary retention at least since September 2019, and is planned to have surgical evaluation in the future.      Pt reports he had his prostate biopsied in the past with benign results and underwent MRI which found a "bright spot" for which he is undergoing continued evaluation. (20 Mar 2020 20:52)  CTap shows Prostate is markedly enlarged. The bilateral epididymides are slightly hyperemic, likely corresponding to findings at sonography. Small bilateral hydroceles and scrotal edema. No subcutaneous emphysema.    Pt seen by Urology.  Cho catheter flushed at the bedside using NS and piston syringe, and found to be functioning properly. Cho draining well after being flush.  Pt on zosyn.  ID consult called for further abx managment.     UTI/Epydido-orchitis:    - Pt with Cho catheter obstruction and scrotal swelling.  s/p replacement of cho (found to have small plug), now draining well.  Diffuse scrotal edema.  Cont abx for possible epidydoorchitis.  Ucx  with < 10K normal erick.    - f/u blood cultures - ngtd    - Vanco added yesterday due to rising WBC.  Also on zosyn.  WBC improved today.  No fever, chills, abd pain or flank pain.  Pt clinically improving.  Change to PO levaquin 250mg qdaily and doxycycline 100mg po bid x 7 more days.    OK to d/c from ID standpoint.  d/w Medicine    will follow,    Jing Clark  [phone]

## 2020-03-24 ENCOUNTER — EMERGENCY (EMERGENCY)
Facility: HOSPITAL | Age: 70
LOS: 1 days | Discharge: HOME | End: 2020-03-24
Attending: STUDENT IN AN ORGANIZED HEALTH CARE EDUCATION/TRAINING PROGRAM
Payer: MEDICARE

## 2020-03-24 VITALS
DIASTOLIC BLOOD PRESSURE: 84 MMHG | SYSTOLIC BLOOD PRESSURE: 146 MMHG | TEMPERATURE: 98 F | WEIGHT: 175.05 LBS | HEIGHT: 64 IN | OXYGEN SATURATION: 98 % | HEART RATE: 90 BPM | RESPIRATION RATE: 17 BRPM

## 2020-03-24 VITALS
HEART RATE: 97 BPM | SYSTOLIC BLOOD PRESSURE: 153 MMHG | OXYGEN SATURATION: 97 % | TEMPERATURE: 98 F | RESPIRATION RATE: 17 BRPM | DIASTOLIC BLOOD PRESSURE: 89 MMHG

## 2020-03-24 PROCEDURE — 99284 EMERGENCY DEPT VISIT MOD MDM: CPT | Mod: 25

## 2020-03-25 PROBLEM — Z87.438 PERSONAL HISTORY OF OTHER DISEASES OF MALE GENITAL ORGANS: Chronic | Status: ACTIVE | Noted: 2020-03-20

## 2020-03-25 LAB
ALBUMIN SERPL ELPH-MCNC: 3.4 G/DL — SIGNIFICANT CHANGE UP (ref 3.3–5)
ALP SERPL-CCNC: 83 U/L — SIGNIFICANT CHANGE UP (ref 40–120)
ALT FLD-CCNC: 24 U/L — SIGNIFICANT CHANGE UP (ref 10–45)
ANION GAP SERPL CALC-SCNC: 16 MMOL/L — SIGNIFICANT CHANGE UP (ref 5–17)
AST SERPL-CCNC: 17 U/L — SIGNIFICANT CHANGE UP (ref 10–40)
BASOPHILS # BLD AUTO: 0.1 K/UL — SIGNIFICANT CHANGE UP (ref 0–0.2)
BASOPHILS NFR BLD AUTO: 0.9 % — SIGNIFICANT CHANGE UP (ref 0–2)
BILIRUB SERPL-MCNC: 0.3 MG/DL — SIGNIFICANT CHANGE UP (ref 0.2–1.2)
BUN SERPL-MCNC: 23 MG/DL — SIGNIFICANT CHANGE UP (ref 7–23)
CALCIUM SERPL-MCNC: 10.4 MG/DL — SIGNIFICANT CHANGE UP (ref 8.4–10.5)
CHLORIDE SERPL-SCNC: 107 MMOL/L — SIGNIFICANT CHANGE UP (ref 96–108)
CO2 SERPL-SCNC: 20 MMOL/L — LOW (ref 22–31)
CREAT SERPL-MCNC: 1.5 MG/DL — HIGH (ref 0.5–1.3)
EOSINOPHIL # BLD AUTO: 0.21 K/UL — SIGNIFICANT CHANGE UP (ref 0–0.5)
EOSINOPHIL NFR BLD AUTO: 1.8 % — SIGNIFICANT CHANGE UP (ref 0–6)
GIANT PLATELETS BLD QL SMEAR: PRESENT — SIGNIFICANT CHANGE UP
GLUCOSE SERPL-MCNC: 248 MG/DL — HIGH (ref 70–99)
HCT VFR BLD CALC: 36 % — LOW (ref 39–50)
HGB BLD-MCNC: 11.2 G/DL — LOW (ref 13–17)
LYMPHOCYTES # BLD AUTO: 0.61 K/UL — LOW (ref 1–3.3)
LYMPHOCYTES # BLD AUTO: 5.3 % — LOW (ref 13–44)
MANUAL SMEAR VERIFICATION: SIGNIFICANT CHANGE UP
MCHC RBC-ENTMCNC: 27.1 PG — SIGNIFICANT CHANGE UP (ref 27–34)
MCHC RBC-ENTMCNC: 31.1 GM/DL — LOW (ref 32–36)
MCV RBC AUTO: 87.2 FL — SIGNIFICANT CHANGE UP (ref 80–100)
MICROCYTES BLD QL: SLIGHT — SIGNIFICANT CHANGE UP
MONOCYTES # BLD AUTO: 0.71 K/UL — SIGNIFICANT CHANGE UP (ref 0–0.9)
MONOCYTES NFR BLD AUTO: 6.2 % — SIGNIFICANT CHANGE UP (ref 2–14)
NEUTROPHILS # BLD AUTO: 9.7 K/UL — HIGH (ref 1.8–7.4)
NEUTROPHILS NFR BLD AUTO: 84.9 % — HIGH (ref 43–77)
OVALOCYTES BLD QL SMEAR: SLIGHT — SIGNIFICANT CHANGE UP
PLAT MORPH BLD: NORMAL — SIGNIFICANT CHANGE UP
PLATELET # BLD AUTO: 298 K/UL — SIGNIFICANT CHANGE UP (ref 150–400)
PLATELET COUNT - ESTIMATE: NORMAL — SIGNIFICANT CHANGE UP
POIKILOCYTOSIS BLD QL AUTO: SIGNIFICANT CHANGE UP
POTASSIUM SERPL-MCNC: 4.1 MMOL/L — SIGNIFICANT CHANGE UP (ref 3.5–5.3)
POTASSIUM SERPL-SCNC: 4.1 MMOL/L — SIGNIFICANT CHANGE UP (ref 3.5–5.3)
PROT SERPL-MCNC: 6.8 G/DL — SIGNIFICANT CHANGE UP (ref 6–8.3)
RBC # BLD: 4.13 M/UL — LOW (ref 4.2–5.8)
RBC # FLD: 15 % — HIGH (ref 10.3–14.5)
RBC BLD AUTO: ABNORMAL
SCHISTOCYTES BLD QL AUTO: SLIGHT — SIGNIFICANT CHANGE UP
SODIUM SERPL-SCNC: 143 MMOL/L — SIGNIFICANT CHANGE UP (ref 135–145)
SPHEROCYTES BLD QL SMEAR: SLIGHT — SIGNIFICANT CHANGE UP
VARIANT LYMPHS # BLD: 0.9 % — SIGNIFICANT CHANGE UP (ref 0–6)
WBC # BLD: 11.43 K/UL — HIGH (ref 3.8–10.5)
WBC # FLD AUTO: 11.43 K/UL — HIGH (ref 3.8–10.5)

## 2020-03-25 PROCEDURE — 51702 INSERT TEMP BLADDER CATH: CPT

## 2020-03-25 PROCEDURE — 80053 COMPREHEN METABOLIC PANEL: CPT

## 2020-03-25 PROCEDURE — 99283 EMERGENCY DEPT VISIT LOW MDM: CPT | Mod: 25

## 2020-03-25 PROCEDURE — 85027 COMPLETE CBC AUTOMATED: CPT

## 2020-03-25 NOTE — ED PROVIDER NOTE - OBJECTIVE STATEMENT
68yo M with PMH of T2DM, CAD s/p stents, htn, hLd, BPH s/p chronic cho due to urinary retention who presents to the ED due to non-draining cho catheter, and swelling of scrotum since last night. patient admitted to hospital and discharged yesterday on doxy and levaquin. stopped draining last night. no fever/chills. scrotal swelling and discomfort unchanged.

## 2020-03-25 NOTE — ED PROVIDER NOTE - DISPOSITION TYPE
Cryotherapy Text: The wound bed was treated with cryotherapy after the biopsy was performed. DISCHARGE

## 2020-03-25 NOTE — ED PROVIDER NOTE - CARE PROVIDER_API CALL
Carlin Espinoza)  Urology  [address], [address]  [place] [address]  Phone: [phone]  Fax: [phone]  Follow Up Time: 1-3 Days

## 2020-03-25 NOTE — ED PROVIDER NOTE - PATIENT PORTAL LINK FT
You can access the FollowMyHealth Patient Portal offered by [facility] by registering at the following website: [email or web address]. By joining [employer]’s FollowMyHealth portal, you will also be able to view your health information using other applications (apps) compatible with our system.

## 2020-03-25 NOTE — ED PROVIDER NOTE - NSFOLLOWUPINSTRUCTIONS_ED_ALL_ED_FT
Follow up with your Primary Care Physician within the next 2-3 days  Bring a copy of your test results with you to your appointment  Continue your current medication regimen  Return to the Emergency Room if you experience new or worsening symptoms  Follow up with dr. mcclain in 7 days  continue your antibiotics as prescribed

## 2020-03-25 NOTE — ED PROVIDER NOTE - ATTENDING CONTRIBUTION TO CARE
Attending MD Hoff:   I personally have seen and examined this patient.  ACP, Resident, medical student note reviewed and agree on plan of care and except where noted.     69y M with multiple comorbidities presents with non-draining cho x 1 day.     On exam well appearing though uncomfortable, vitals wnl, rrr s1s2, lungs clear, abdomen soft, suprapubic tenderness to palpation, cho in place, no urine in bag.    Cho replaced>>>draining urine, 400cc out. Low suspicion for post-obstructive diuresis. Will obtain labs to evaluate kidney function, reassess.

## 2020-03-25 NOTE — ED PROVIDER NOTE - PROGRESS NOTE DETAILS
AV: cho drained approx 400cc. Labs reviewed. Patient feels better, want to go home. Will dc with uro follow-up.

## 2020-03-25 NOTE — ED ADULT NURSE NOTE - OBJECTIVE STATEMENT
69YOM pmh DM2, CAD s/p stents, HTN, HLD, BPH  s/p chronic cho due to urinary retention who presents to the ED due to non-draining cho catheter, and swelling of scrotum since last night. patient admitted to hospital and discharged yesterday on doxy and levaquin. stopped draining last night. no fever/chills. scrotal swelling and discomfort unchanged.

## 2020-03-25 NOTE — ED PROVIDER NOTE - PMH
CAD (coronary artery disease)    DM (diabetes mellitus), type 2    Dyslipidemia    History of BPH    HTN (hypertension)    Obesity

## 2020-03-26 LAB
CULTURE RESULTS: SIGNIFICANT CHANGE UP
CULTURE RESULTS: SIGNIFICANT CHANGE UP
SPECIMEN SOURCE: SIGNIFICANT CHANGE UP
SPECIMEN SOURCE: SIGNIFICANT CHANGE UP

## 2020-04-15 ENCOUNTER — TRANSCRIPTION ENCOUNTER (OUTPATIENT)
Age: 70
End: 2020-04-15

## 2020-04-15 ENCOUNTER — EMERGENCY (EMERGENCY)
Facility: HOSPITAL | Age: 70
LOS: 1 days | Discharge: HOME | End: 2020-04-15
Payer: MEDICARE

## 2020-04-15 VITALS
SYSTOLIC BLOOD PRESSURE: 171 MMHG | HEART RATE: 108 BPM | DIASTOLIC BLOOD PRESSURE: 94 MMHG | RESPIRATION RATE: 19 BRPM | TEMPERATURE: 98 F | HEIGHT: 63 IN | WEIGHT: 175.05 LBS | OXYGEN SATURATION: 97 %

## 2020-04-15 VITALS
SYSTOLIC BLOOD PRESSURE: 138 MMHG | HEART RATE: 82 BPM | DIASTOLIC BLOOD PRESSURE: 78 MMHG | RESPIRATION RATE: 18 BRPM | OXYGEN SATURATION: 98 % | TEMPERATURE: 99 F

## 2020-04-15 LAB
ANION GAP SERPL CALC-SCNC: 14 MMOL/L — SIGNIFICANT CHANGE UP (ref 5–17)
ANION GAP SERPL CALC-SCNC: 18 MMOL/L — HIGH (ref 5–17)
APPEARANCE UR: ABNORMAL
BACTERIA # UR AUTO: ABNORMAL
BASOPHILS # BLD AUTO: 0.04 K/UL — SIGNIFICANT CHANGE UP (ref 0–0.2)
BASOPHILS NFR BLD AUTO: 0.3 % — SIGNIFICANT CHANGE UP (ref 0–2)
BILIRUB UR-MCNC: NEGATIVE — SIGNIFICANT CHANGE UP
BUN SERPL-MCNC: 20 MG/DL — SIGNIFICANT CHANGE UP (ref 7–23)
BUN SERPL-MCNC: 23 MG/DL — SIGNIFICANT CHANGE UP (ref 7–23)
CALCIUM SERPL-MCNC: 9.1 MG/DL — SIGNIFICANT CHANGE UP (ref 8.4–10.5)
CALCIUM SERPL-MCNC: 9.3 MG/DL — SIGNIFICANT CHANGE UP (ref 8.4–10.5)
CHLORIDE SERPL-SCNC: 107 MMOL/L — SIGNIFICANT CHANGE UP (ref 96–108)
CHLORIDE SERPL-SCNC: 109 MMOL/L — HIGH (ref 96–108)
CO2 SERPL-SCNC: 16 MMOL/L — LOW (ref 22–31)
CO2 SERPL-SCNC: 16 MMOL/L — LOW (ref 22–31)
COLOR SPEC: ABNORMAL
CREAT SERPL-MCNC: 1.28 MG/DL — SIGNIFICANT CHANGE UP (ref 0.5–1.3)
CREAT SERPL-MCNC: 1.47 MG/DL — HIGH (ref 0.5–1.3)
DIFF PNL FLD: ABNORMAL
EOSINOPHIL # BLD AUTO: 0.11 K/UL — SIGNIFICANT CHANGE UP (ref 0–0.5)
EOSINOPHIL NFR BLD AUTO: 0.9 % — SIGNIFICANT CHANGE UP (ref 0–6)
EPI CELLS # UR: 2 /HPF — SIGNIFICANT CHANGE UP
GLUCOSE SERPL-MCNC: 171 MG/DL — HIGH (ref 70–99)
GLUCOSE SERPL-MCNC: 202 MG/DL — HIGH (ref 70–99)
GLUCOSE UR QL: ABNORMAL
HCT VFR BLD CALC: 35.7 % — LOW (ref 39–50)
HGB BLD-MCNC: 11.3 G/DL — LOW (ref 13–17)
HYALINE CASTS # UR AUTO: 2 /LPF — SIGNIFICANT CHANGE UP (ref 0–7)
IMM GRANULOCYTES NFR BLD AUTO: 0.3 % — SIGNIFICANT CHANGE UP (ref 0–1.5)
KETONES UR-MCNC: NEGATIVE — SIGNIFICANT CHANGE UP
LEUKOCYTE ESTERASE UR-ACNC: ABNORMAL
LYMPHOCYTES # BLD AUTO: 0.66 K/UL — LOW (ref 1–3.3)
LYMPHOCYTES # BLD AUTO: 5.7 % — LOW (ref 13–44)
MCHC RBC-ENTMCNC: 27.8 PG — SIGNIFICANT CHANGE UP (ref 27–34)
MCHC RBC-ENTMCNC: 31.7 GM/DL — LOW (ref 32–36)
MCV RBC AUTO: 87.9 FL — SIGNIFICANT CHANGE UP (ref 80–100)
MONOCYTES # BLD AUTO: 1.13 K/UL — HIGH (ref 0–0.9)
MONOCYTES NFR BLD AUTO: 9.7 % — SIGNIFICANT CHANGE UP (ref 2–14)
NEUTROPHILS # BLD AUTO: 9.68 K/UL — HIGH (ref 1.8–7.4)
NEUTROPHILS NFR BLD AUTO: 83.1 % — HIGH (ref 43–77)
NITRITE UR-MCNC: POSITIVE
NRBC # BLD: 0 /100 WBCS — SIGNIFICANT CHANGE UP (ref 0–0)
PH UR: 6 — SIGNIFICANT CHANGE UP (ref 5–8)
PLATELET # BLD AUTO: 197 K/UL — SIGNIFICANT CHANGE UP (ref 150–400)
POTASSIUM SERPL-MCNC: 3.4 MMOL/L — LOW (ref 3.5–5.3)
POTASSIUM SERPL-MCNC: 4 MMOL/L — SIGNIFICANT CHANGE UP (ref 3.5–5.3)
POTASSIUM SERPL-SCNC: 3.4 MMOL/L — LOW (ref 3.5–5.3)
POTASSIUM SERPL-SCNC: 4 MMOL/L — SIGNIFICANT CHANGE UP (ref 3.5–5.3)
PROT UR-MCNC: 100 — SIGNIFICANT CHANGE UP
RBC # BLD: 4.06 M/UL — LOW (ref 4.2–5.8)
RBC # FLD: 15.5 % — HIGH (ref 10.3–14.5)
RBC CASTS # UR COMP ASSIST: 40 /HPF — HIGH (ref 0–4)
SODIUM SERPL-SCNC: 139 MMOL/L — SIGNIFICANT CHANGE UP (ref 135–145)
SODIUM SERPL-SCNC: 141 MMOL/L — SIGNIFICANT CHANGE UP (ref 135–145)
SP GR SPEC: 1.02 — SIGNIFICANT CHANGE UP (ref 1.01–1.02)
UROBILINOGEN FLD QL: NEGATIVE — SIGNIFICANT CHANGE UP
WBC # BLD: 11.66 K/UL — HIGH (ref 3.8–10.5)
WBC # FLD AUTO: 11.66 K/UL — HIGH (ref 3.8–10.5)
WBC UR QL: >50

## 2020-04-15 PROCEDURE — 87186 SC STD MICRODIL/AGAR DIL: CPT

## 2020-04-15 PROCEDURE — 80048 BASIC METABOLIC PNL TOTAL CA: CPT

## 2020-04-15 PROCEDURE — 99284 EMERGENCY DEPT VISIT MOD MDM: CPT | Mod: 25

## 2020-04-15 PROCEDURE — 99284 EMERGENCY DEPT VISIT MOD MDM: CPT | Mod: GC

## 2020-04-15 PROCEDURE — 81001 URINALYSIS AUTO W/SCOPE: CPT

## 2020-04-15 PROCEDURE — 85027 COMPLETE CBC AUTOMATED: CPT

## 2020-04-15 PROCEDURE — 96374 THER/PROPH/DIAG INJ IV PUSH: CPT | Mod: XU

## 2020-04-15 PROCEDURE — 87086 URINE CULTURE/COLONY COUNT: CPT

## 2020-04-15 PROCEDURE — 51702 INSERT TEMP BLADDER CATH: CPT

## 2020-04-15 RX ORDER — AZTREONAM 2 G
1 VIAL (EA) INJECTION
Qty: 20 | Refills: 0
Start: 2020-04-15 | End: 2020-04-24

## 2020-04-15 RX ORDER — POTASSIUM CHLORIDE 20 MEQ
20 PACKET (EA) ORAL ONCE
Refills: 0 | Status: COMPLETED | OUTPATIENT
Start: 2020-04-15 | End: 2020-04-15

## 2020-04-15 RX ORDER — ACETAMINOPHEN 500 MG
650 TABLET ORAL ONCE
Refills: 0 | Status: COMPLETED | OUTPATIENT
Start: 2020-04-15 | End: 2020-04-15

## 2020-04-15 RX ORDER — CEFTRIAXONE 500 MG/1
1000 INJECTION, POWDER, FOR SOLUTION INTRAMUSCULAR; INTRAVENOUS ONCE
Refills: 0 | Status: COMPLETED | OUTPATIENT
Start: 2020-04-15 | End: 2020-04-15

## 2020-04-15 RX ORDER — SODIUM CHLORIDE 9 MG/ML
1000 INJECTION, SOLUTION INTRAVENOUS ONCE
Refills: 0 | Status: COMPLETED | OUTPATIENT
Start: 2020-04-15 | End: 2020-04-15

## 2020-04-15 RX ADMIN — CEFTRIAXONE 100 MILLIGRAM(S): 500 INJECTION, POWDER, FOR SOLUTION INTRAMUSCULAR; INTRAVENOUS at 06:06

## 2020-04-15 RX ADMIN — SODIUM CHLORIDE 1000 MILLILITER(S): 9 INJECTION, SOLUTION INTRAVENOUS at 06:54

## 2020-04-15 RX ADMIN — Medication 650 MILLIGRAM(S): at 06:06

## 2020-04-15 RX ADMIN — Medication 20 MILLIEQUIVALENT(S): at 06:53

## 2020-04-15 NOTE — ED ADULT NURSE NOTE - OBJECTIVE STATEMENT
70 year old male presents to ED via walk-in complaining of urinary retention. Arrived with chronic Davies, last changed x3 weeks ago. Has not made urine in one day, states he is feeling distended and uncomfortable. Upon assessment A&O x4, ambulatory with steady gait and uncomfortable. Urine bag from current Davies streaked with dark red blood, states this is abnormal. Davies removed by RN. Bleeding from penis noted after removal, dark red and thick. 16F Davies placed by RN, sterile technique maintained. ED Tech at bedside to monitor sterility. Pt tolerated procedure well. 300cc urine drained, dark and cloudy. Some blood present in urine as well. Urine analysis and urine culture collected. Leg bag provided for patient. Pt felt relief prior to procedure. MD at bedside to assess. Denies HA, chest pain, SOB, n/v/d, fever, chills. VS as documented. Bed locked and lowered. Comfort and safety measures maintained.

## 2020-04-15 NOTE — ED PROVIDER NOTE - CLINICAL SUMMARY MEDICAL DECISION MAKING FREE TEXT BOX
Jonathan Weil, PGY3 - acute retention from obstructed cho, with rectal temp = 100.4 suggestive of urinary source in the absence of any other infectious sxs. Otherwise well appearing. Plan for cbc/chemistry, UA/U-culture, treat empirically, urology f/u.

## 2020-04-15 NOTE — ED PROVIDER NOTE - NSFOLLOWUPINSTRUCTIONS_ED_ALL_ED_FT
Follow up with urology in 3-5 days.    Return to the ER for recurrent cho catheter dysfunction, fevers, vomiting, or any other new concerning symptoms.    If you were prescribed any medications please refer to the package insert for complete instructions on medication use and to review potential side effects. Please call the emergency department or speak with your primary physician if you have any additional questions regarding how to use this medication. Follow up with urology in 3-5 days. Take bactrim 800 twice day for the next 10 days.    Return to the ER for recurrent cho catheter dysfunction, fevers, vomiting, or any other new concerning symptoms.    If you were prescribed any medications please refer to the package insert for complete instructions on medication use and to review potential side effects. Please call the emergency department or speak with your primary physician if you have any additional questions regarding how to use this medication.

## 2020-04-15 NOTE — ED PROVIDER NOTE - OBJECTIVE STATEMENT
70M hx DM/HTN/BPH/chronic recurrent urinary tract obstructions requiring catheterization, last cho placed 3 weeks ago and retained until the present, p/w retention. His catheter has not drained since yesterday, with gradually worsening lower abd distension and discomfort. Denies fever/chills/n/v/d.

## 2020-04-15 NOTE — ED PROVIDER NOTE - PATIENT PORTAL LINK FT
You can access the FollowMyHealth Patient Portal offered by [facility] by registering at the following website: [email or web address]. By joining [employer]’s FollowMyHealth portal, you will also be able to view your health information using other applications (apps) compatible with our system.

## 2020-04-15 NOTE — ED ADULT NURSE REASSESSMENT NOTE - NS ED NURSE REASSESS COMMENT FT1
Report received from Marcy RN. Upon assessment, pt is AO x 4, speaking in full sentences, reporting no pain. Pt receiving 1 L LR, 20 g L AC. Davies catheter putting out dark urine brownish Pt resting comfortably, BMP to be drawn following LR completion. Will continue to monitor.

## 2020-04-15 NOTE — ED ADULT NURSE NOTE - NSIMPLEMENTINTERV_GEN_ALL_ED
Implemented All Universal Safety Interventions:  [place] to call system. Call bell, personal items and telephone within reach. Instruct patient to call for assistance. Room bathroom lighting operational. Non-slip footwear when patient is off stretcher. Physically safe environment: no spills, clutter or unnecessary equipment. Stretcher in lowest position, wheels locked, appropriate side rails in place.

## 2020-04-15 NOTE — ED PROVIDER NOTE - PHYSICAL EXAMINATION
General: A&Ox3, well nourished, no acute distress  HENT: NC/AT. Posterior oropharynx clear. Patent airway  Eyes: PERRL, EOMI  CV: RRR, no m/r/g. 2+ peripheral pulses. Extremities are warm and well perfused.  Respiratory: CTAB no w/r/r. No respiratory distress.  Abdominal: soft, non-distended, non-tender, no rebound, guarding, or rigidity  Neuro: No focal deficits  Skin: no rashes  : scant amount of blood around the meatus from after removal of the old cho, before insertion of new cho catheter  Psych: normal mood and affect

## 2020-04-15 NOTE — ED PROVIDER NOTE - ATTENDING CONTRIBUTION TO CARE
MD Ramos:  patient seen and evaluated with the resident.  I was present for key portions of the History & Physical, and I agree with the Impression & Plan.  MD Ramos:  69 yo M, indwelling cho catheter x3wk, c/o obstruction.  Associated Sx: denies f/c, n/v; only suprapubic abdominal pain.  Quality:  like prior episodes of urinary obstruction.    Better: significant relief from cho catheter change.    VS: Temp 100.4, otherwise wnl.  Physical Exam: adult M, uncomfortable-appearing (much better s/p cho change) NCAT, PERRL, EOMI, neck supple, RRR, CTA B, Abd: s/nd/nt, Ext: no edema, Neuro: AAOx3, ambulates w/o diff, strength 5/5 & symmetric throughout.  Turbid urine return from cho catheter.  Impression:  urinary obstruction, now relieved.  Although patient is VERY well appearing, he is a diabetic with a fever and likely UTI.  As such, he warrants labs, UA/UCx, and empiric coverage with Abx.  Prior Ucx showed pan-sensitive Klebsiella.  If Creatinine at baseline, patient is tolerating POs, he could be dc'd on Abx with strict return precautions.

## 2020-04-15 NOTE — ED PROVIDER NOTE - PROGRESS NOTE DETAILS
Elizabeth Goldberger PGY-3: pt signed out to me pending fluid and rpt labs given previously slightly elevated AG and bicarb 16. AG now WNL, bicarb unchanged. Pt feeling well. Has replaced cho, given abx for fever and bactrim sent for outpt. Will dc Dr Aguilera Note: received pt at s/o, urinary retention +UTI  AG improved, pt looks well, offers no complaints, abdomen soft and nontender, discussed plan for urology close f/u and strict return precautions, pt verbalizes understanding all dc instructions

## 2020-04-18 NOTE — ED POST DISCHARGE NOTE - DETAILS
4/19/2020: spoke to son Titi and made aware to discontinue bactrim and start cefdinir 300mg BID x10 days as UCX sensitive to cefdinir and resistant to bactrim. Urged strict return precautions. Patient son will relay message -Suzan Reynoso PA-C

## 2020-04-18 NOTE — ED POST DISCHARGE NOTE - RESULT SUMMARY
ucx prelim w/ >100k GNR on bactrim. Will follow sens and determine need to treat - Marlen Jones PA-C

## 2020-04-19 RX ORDER — CEFDINIR 250 MG/5ML
1 POWDER, FOR SUSPENSION ORAL
Qty: 20 | Refills: 0
Start: 2020-04-19 | End: 2020-04-28

## 2020-07-16 ENCOUNTER — OUTPATIENT (OUTPATIENT)
Dept: OUTPATIENT SERVICES | Facility: HOSPITAL | Age: 70
LOS: 1 days | End: 2020-07-16
Payer: MEDICARE

## 2020-07-16 VITALS
RESPIRATION RATE: 16 BRPM | DIASTOLIC BLOOD PRESSURE: 75 MMHG | HEART RATE: 61 BPM | HEIGHT: 63 IN | OXYGEN SATURATION: 96 % | SYSTOLIC BLOOD PRESSURE: 137 MMHG | TEMPERATURE: 99 F | WEIGHT: 167.99 LBS

## 2020-07-16 DIAGNOSIS — R33.0 DRUG INDUCED RETENTION OF URINE: ICD-10-CM

## 2020-07-16 DIAGNOSIS — N40.1 BENIGN PROSTATIC HYPERPLASIA WITH LOWER URINARY TRACT SYMPTOMS: ICD-10-CM

## 2020-07-16 DIAGNOSIS — Z98.890 OTHER SPECIFIED POSTPROCEDURAL STATES: Chronic | ICD-10-CM

## 2020-07-16 DIAGNOSIS — Z01.818 ENCOUNTER FOR OTHER PREPROCEDURAL EXAMINATION: ICD-10-CM

## 2020-07-16 DIAGNOSIS — Z95.5 PRESENCE OF CORONARY ANGIOPLASTY IMPLANT AND GRAFT: ICD-10-CM

## 2020-07-16 DIAGNOSIS — E11.9 TYPE 2 DIABETES MELLITUS WITHOUT COMPLICATIONS: ICD-10-CM

## 2020-07-16 LAB
A1C WITH ESTIMATED AVERAGE GLUCOSE RESULT: 8.2 % — HIGH (ref 4–5.6)
ALBUMIN SERPL ELPH-MCNC: 3.6 G/DL — SIGNIFICANT CHANGE UP (ref 3.3–5)
ALP SERPL-CCNC: 110 U/L — SIGNIFICANT CHANGE UP (ref 40–120)
ALT FLD-CCNC: 20 U/L — SIGNIFICANT CHANGE UP (ref 12–78)
ANION GAP SERPL CALC-SCNC: 7 MMOL/L — SIGNIFICANT CHANGE UP (ref 5–17)
APPEARANCE UR: ABNORMAL
AST SERPL-CCNC: 14 U/L — LOW (ref 15–37)
BILIRUB SERPL-MCNC: 0.6 MG/DL — SIGNIFICANT CHANGE UP (ref 0.2–1.2)
BILIRUB UR-MCNC: NEGATIVE — SIGNIFICANT CHANGE UP
BUN SERPL-MCNC: 21 MG/DL — SIGNIFICANT CHANGE UP (ref 7–23)
CALCIUM SERPL-MCNC: 9.3 MG/DL — SIGNIFICANT CHANGE UP (ref 8.5–10.1)
CHLORIDE SERPL-SCNC: 111 MMOL/L — HIGH (ref 96–108)
CO2 SERPL-SCNC: 23 MMOL/L — SIGNIFICANT CHANGE UP (ref 22–31)
COLOR SPEC: YELLOW — SIGNIFICANT CHANGE UP
CREAT SERPL-MCNC: 1.3 MG/DL — SIGNIFICANT CHANGE UP (ref 0.5–1.3)
DIFF PNL FLD: ABNORMAL
ESTIMATED AVERAGE GLUCOSE: 189 MG/DL — HIGH (ref 68–114)
GLUCOSE SERPL-MCNC: 144 MG/DL — HIGH (ref 70–99)
GLUCOSE UR QL: NEGATIVE — SIGNIFICANT CHANGE UP
HCT VFR BLD CALC: 40.5 % — SIGNIFICANT CHANGE UP (ref 39–50)
HGB BLD-MCNC: 13.4 G/DL — SIGNIFICANT CHANGE UP (ref 13–17)
KETONES UR-MCNC: NEGATIVE — SIGNIFICANT CHANGE UP
LEUKOCYTE ESTERASE UR-ACNC: ABNORMAL
MCHC RBC-ENTMCNC: 28 PG — SIGNIFICANT CHANGE UP (ref 27–34)
MCHC RBC-ENTMCNC: 33.1 GM/DL — SIGNIFICANT CHANGE UP (ref 32–36)
MCV RBC AUTO: 84.7 FL — SIGNIFICANT CHANGE UP (ref 80–100)
NITRITE UR-MCNC: POSITIVE
NRBC # BLD: 0 /100 WBCS — SIGNIFICANT CHANGE UP (ref 0–0)
PH UR: 5 — SIGNIFICANT CHANGE UP (ref 5–8)
PLATELET # BLD AUTO: 209 K/UL — SIGNIFICANT CHANGE UP (ref 150–400)
POTASSIUM SERPL-MCNC: 3.9 MMOL/L — SIGNIFICANT CHANGE UP (ref 3.5–5.3)
POTASSIUM SERPL-SCNC: 3.9 MMOL/L — SIGNIFICANT CHANGE UP (ref 3.5–5.3)
PROT SERPL-MCNC: 7 G/DL — SIGNIFICANT CHANGE UP (ref 6–8.3)
PROT UR-MCNC: NEGATIVE — SIGNIFICANT CHANGE UP
RBC # BLD: 4.78 M/UL — SIGNIFICANT CHANGE UP (ref 4.2–5.8)
RBC # FLD: 13.3 % — SIGNIFICANT CHANGE UP (ref 10.3–14.5)
SODIUM SERPL-SCNC: 141 MMOL/L — SIGNIFICANT CHANGE UP (ref 135–145)
SP GR SPEC: 1.01 — SIGNIFICANT CHANGE UP (ref 1.01–1.02)
UROBILINOGEN FLD QL: NEGATIVE — SIGNIFICANT CHANGE UP
WBC # BLD: 7.24 K/UL — SIGNIFICANT CHANGE UP (ref 3.8–10.5)
WBC # FLD AUTO: 7.24 K/UL — SIGNIFICANT CHANGE UP (ref 3.8–10.5)

## 2020-07-16 PROCEDURE — 87186 SC STD MICRODIL/AGAR DIL: CPT

## 2020-07-16 PROCEDURE — 80053 COMPREHEN METABOLIC PANEL: CPT

## 2020-07-16 PROCEDURE — 83036 HEMOGLOBIN GLYCOSYLATED A1C: CPT

## 2020-07-16 PROCEDURE — 85027 COMPLETE CBC AUTOMATED: CPT

## 2020-07-16 PROCEDURE — 87086 URINE CULTURE/COLONY COUNT: CPT

## 2020-07-16 PROCEDURE — 81001 URINALYSIS AUTO W/SCOPE: CPT

## 2020-07-16 PROCEDURE — 93010 ELECTROCARDIOGRAM REPORT: CPT

## 2020-07-16 PROCEDURE — 93005 ELECTROCARDIOGRAM TRACING: CPT

## 2020-07-16 PROCEDURE — G0463: CPT

## 2020-07-16 PROCEDURE — 36415 COLL VENOUS BLD VENIPUNCTURE: CPT

## 2020-07-16 RX ORDER — LAMOTRIGINE 25 MG/1
1 TABLET, ORALLY DISINTEGRATING ORAL
Qty: 0 | Refills: 0 | DISCHARGE

## 2020-07-16 RX ORDER — NIFEDIPINE 30 MG
1 TABLET, EXTENDED RELEASE 24 HR ORAL
Qty: 0 | Refills: 0 | DISCHARGE

## 2020-07-16 NOTE — H&P PST ADULT - NSICDXPROBLEM_GEN_ALL_CORE_FT
PROBLEM DIAGNOSES  Problem: Enlarged prostate with lower urinary tract symptoms (LUTS)  Assessment and Plan: scheduled for greenlight laser prostate on 7/27/2020     Problem: Pre-op evaluation  Assessment and Plan: Labs - CBC, CMP, UA, C/S, A1c and EKG. To be swabbed for COVID @ [facility]. Appt TBD    Seen and cleared by PCP. MC on chart.  Pre op and Hibiclens instructions reviewed and given. Take routine am meds DOS with sip of water. Instructed to avoid NSAIDs and OTC supplements. Verbalized understanding     Problem: DM (diabetes mellitus)  Assessment and Plan: A1c pending. Last A1c 8.1. Patient advised that (s)he would need endo clearance if A1c came back 9 or above. Hold Metformin for 24 hours. Last dose Sat 7/25. Hold all day 7/26 and DOS 7/27. Verbalized understanding. Fingerstick am of surgery.     Problem: Stented coronary artery  Assessment and Plan: CC with. Dr. Gab Gomez. Continue ASA. Ok'd by Dr. Kevin. Cardiac meds am of surgery with sip of water. Verbalized understanding.

## 2020-07-16 NOTE — H&P PST ADULT - NSICDXPASTMEDICALHX_GEN_ALL_CORE_FT
PAST MEDICAL HISTORY:  CAD (coronary artery disease)     CAD (coronary artery disease)     DM (diabetes mellitus), type 2     Drug-induced retention of urine     Dyslipidemia     Enlarged prostate with lower urinary tract symptoms (LUTS)     History of BPH     HTN (hypertension)     Obesity

## 2020-07-16 NOTE — H&P PST ADULT - NSICDXPASTSURGICALHX_GEN_ALL_CORE_FT
PAST SURGICAL HISTORY:  H/O prostate biopsy Dec 2019    Stented coronary artery 5/26/2010 with Dr. Brayan [facility]ao [facility]

## 2020-07-16 NOTE — H&P PST ADULT - NSANTHOSAYNRD_GEN_A_CORE
No. CAROLYN screening performed.  STOP BANG Legend: 0-2 = LOW Risk; 3-4 = INTERMEDIATE Risk; 5-8 = HIGH Risk

## 2020-07-16 NOTE — H&P PST ADULT - HISTORY OF PRESENT ILLNESS
71 yo male with T2DM, CAD s/p stents, HTN, HLD and BPH s/p chronic cho due to urinary retention for the past year, presents to PST scheduled for greenlight laser prostate on 7/27/2020. Reports some discomfort. Denies renal colic, flank pain and hematuria. H/O prostate biopsy in Dec 2019 with benign results. Scheduled for a catheter change and urine culture today with urologist.

## 2020-07-19 LAB
-  AMIKACIN: SIGNIFICANT CHANGE UP
-  AMOXICILLIN/CLAVULANIC ACID: SIGNIFICANT CHANGE UP
-  AMPICILLIN/SULBACTAM: SIGNIFICANT CHANGE UP
-  AMPICILLIN: SIGNIFICANT CHANGE UP
-  AZTREONAM: SIGNIFICANT CHANGE UP
-  CEFAZOLIN: SIGNIFICANT CHANGE UP
-  CEFEPIME: SIGNIFICANT CHANGE UP
-  CEFOTAXIME: SIGNIFICANT CHANGE UP
-  CEFOXITIN: SIGNIFICANT CHANGE UP
-  CEFTAZIDIME: SIGNIFICANT CHANGE UP
-  CEFTRIAXONE: SIGNIFICANT CHANGE UP
-  CEFUROXIME: SIGNIFICANT CHANGE UP
-  CIPROFLOXACIN: SIGNIFICANT CHANGE UP
-  ERTAPENEM: SIGNIFICANT CHANGE UP
-  GENTAMICIN: SIGNIFICANT CHANGE UP
-  LEVOFLOXACIN: SIGNIFICANT CHANGE UP
-  MEROPENEM: SIGNIFICANT CHANGE UP
-  MINOCYCLINE: SIGNIFICANT CHANGE UP
-  NITROFURANTOIN: SIGNIFICANT CHANGE UP
-  PIPERACILLIN/TAZOBACTAM: SIGNIFICANT CHANGE UP
-  TIGECYCLINE: SIGNIFICANT CHANGE UP
-  TOBRAMYCIN: SIGNIFICANT CHANGE UP
-  TRIMETHOPRIM/SULFAMETHOXAZOLE: SIGNIFICANT CHANGE UP
METHOD TYPE: SIGNIFICANT CHANGE UP

## 2020-07-20 LAB
-  AMIKACIN: SIGNIFICANT CHANGE UP
-  AMOXICILLIN/CLAVULANIC ACID: SIGNIFICANT CHANGE UP
-  AMPICILLIN/SULBACTAM: SIGNIFICANT CHANGE UP
-  AMPICILLIN: SIGNIFICANT CHANGE UP
-  AZTREONAM: SIGNIFICANT CHANGE UP
-  CEFAZOLIN: SIGNIFICANT CHANGE UP
-  CEFEPIME: SIGNIFICANT CHANGE UP
-  CEFOXITIN: SIGNIFICANT CHANGE UP
-  CEFTRIAXONE: SIGNIFICANT CHANGE UP
-  CIPROFLOXACIN: SIGNIFICANT CHANGE UP
-  ERTAPENEM: SIGNIFICANT CHANGE UP
-  GENTAMICIN: SIGNIFICANT CHANGE UP
-  IMIPENEM: SIGNIFICANT CHANGE UP
-  LEVOFLOXACIN: SIGNIFICANT CHANGE UP
-  MEROPENEM: SIGNIFICANT CHANGE UP
-  NITROFURANTOIN: SIGNIFICANT CHANGE UP
-  PIPERACILLIN/TAZOBACTAM: SIGNIFICANT CHANGE UP
-  TIGECYCLINE: SIGNIFICANT CHANGE UP
-  TOBRAMYCIN: SIGNIFICANT CHANGE UP
-  TRIMETHOPRIM/SULFAMETHOXAZOLE: SIGNIFICANT CHANGE UP
CULTURE RESULTS: SIGNIFICANT CHANGE UP
METHOD TYPE: SIGNIFICANT CHANGE UP
ORGANISM # SPEC MICROSCOPIC CNT: SIGNIFICANT CHANGE UP
SPECIMEN SOURCE: SIGNIFICANT CHANGE UP

## 2020-07-21 ENCOUNTER — NON-APPOINTMENT (OUTPATIENT)
Age: 70
End: 2020-07-21

## 2020-07-21 ENCOUNTER — APPOINTMENT (OUTPATIENT)
Dept: CARDIOLOGY | Facility: CLINIC | Age: 70
End: 2020-07-21
Payer: MEDICARE

## 2020-07-21 VITALS
SYSTOLIC BLOOD PRESSURE: 128 MMHG | DIASTOLIC BLOOD PRESSURE: 71 MMHG | OXYGEN SATURATION: 95 % | TEMPERATURE: 98.1 F | HEART RATE: 70 BPM | BODY MASS INDEX: 31.37 KG/M2 | WEIGHT: 166 LBS

## 2020-07-21 DIAGNOSIS — I25.10 ATHEROSCLEROTIC HEART DISEASE OF NATIVE CORONARY ARTERY W/OUT ANGINA PECTORIS: ICD-10-CM

## 2020-07-21 DIAGNOSIS — I10 ESSENTIAL (PRIMARY) HYPERTENSION: ICD-10-CM

## 2020-07-21 DIAGNOSIS — E11.9 TYPE 2 DIABETES MELLITUS W/OUT COMPLICATIONS: ICD-10-CM

## 2020-07-21 DIAGNOSIS — Z01.810 ENCOUNTER FOR PREPROCEDURAL CARDIOVASCULAR EXAMINATION: ICD-10-CM

## 2020-07-21 PROCEDURE — 93000 ELECTROCARDIOGRAM COMPLETE: CPT

## 2020-07-21 PROCEDURE — 99204 OFFICE O/P NEW MOD 45 MIN: CPT

## 2020-07-21 NOTE — DISCUSSION/SUMMARY
[FreeTextEntry1] : The patient was examined.  His blood pressure was 128/71, and his pulse was 70.  His lungs were clear to auscultation.  Cardiac exam was negative for murmurs rubs or gallops.  His EKG showed normal sinus rhythm and was within normal limits.  The patient is cardiologically cleared for surgery.  He should continue his aspirin 81 mg perioperatively.

## 2020-07-21 NOTE — REVIEW OF SYSTEMS
[Shortness Of Breath] : no shortness of breath [Chest Pain] : no chest pain [Palpitations] : no palpitations

## 2020-07-21 NOTE — REASON FOR VISIT
[FreeTextEntry1] : This is the first office visit in over 4-1/2 years for this 70-year-old white male who needs cardiological clearance prior to having green laser prostate surgery.  The patient is status post coronary artery stent in 2010.  He is maintained on aspirin.  He denies any chest pains, shortness of breath, or palpitations.\par \par The patient never smoked.  He has no alcohol or caffeine intake.\par \par He currently takes aspirin, atorvastatin, metoprolol, nifedipine, tamsulosin, and metformin.\par \par He has no known drug allergies.

## 2020-07-22 PROBLEM — R33.0: Chronic | Status: ACTIVE | Noted: 2020-07-16

## 2020-07-22 PROBLEM — N40.1 BENIGN PROSTATIC HYPERPLASIA WITH LOWER URINARY TRACT SYMPTOMS: Chronic | Status: ACTIVE | Noted: 2020-07-16

## 2020-07-22 PROBLEM — I25.10 ATHEROSCLEROTIC HEART DISEASE OF NATIVE CORONARY ARTERY WITHOUT ANGINA PECTORIS: Chronic | Status: ACTIVE | Noted: 2020-07-16

## 2020-07-24 ENCOUNTER — OUTPATIENT (OUTPATIENT)
Dept: OUTPATIENT SERVICES | Facility: HOSPITAL | Age: 70
LOS: 1 days | End: 2020-07-24
Payer: MEDICARE

## 2020-07-24 DIAGNOSIS — Z98.890 OTHER SPECIFIED POSTPROCEDURAL STATES: Chronic | ICD-10-CM

## 2020-07-24 DIAGNOSIS — Z11.59 ENCOUNTER FOR SCREENING FOR OTHER VIRAL DISEASES: ICD-10-CM

## 2020-07-24 LAB — SARS-COV-2 RNA SPEC QL NAA+PROBE: SIGNIFICANT CHANGE UP

## 2020-07-24 PROCEDURE — U0003: CPT

## 2020-07-26 ENCOUNTER — TRANSCRIPTION ENCOUNTER (OUTPATIENT)
Age: 70
End: 2020-07-26

## 2020-07-27 ENCOUNTER — OUTPATIENT (OUTPATIENT)
Dept: OUTPATIENT SERVICES | Facility: HOSPITAL | Age: 70
LOS: 1 days | End: 2020-07-27
Payer: MEDICARE

## 2020-07-27 VITALS
RESPIRATION RATE: 14 BRPM | DIASTOLIC BLOOD PRESSURE: 83 MMHG | OXYGEN SATURATION: 96 % | HEART RATE: 62 BPM | SYSTOLIC BLOOD PRESSURE: 156 MMHG

## 2020-07-27 VITALS
RESPIRATION RATE: 15 BRPM | WEIGHT: 167.99 LBS | HEIGHT: 63 IN | OXYGEN SATURATION: 98 % | HEART RATE: 63 BPM | TEMPERATURE: 98 F | SYSTOLIC BLOOD PRESSURE: 135 MMHG | DIASTOLIC BLOOD PRESSURE: 78 MMHG

## 2020-07-27 DIAGNOSIS — R33.0 DRUG INDUCED RETENTION OF URINE: ICD-10-CM

## 2020-07-27 DIAGNOSIS — Z98.890 OTHER SPECIFIED POSTPROCEDURAL STATES: Chronic | ICD-10-CM

## 2020-07-27 DIAGNOSIS — N40.1 BENIGN PROSTATIC HYPERPLASIA WITH LOWER URINARY TRACT SYMPTOMS: ICD-10-CM

## 2020-07-27 PROCEDURE — 82962 GLUCOSE BLOOD TEST: CPT

## 2020-07-27 PROCEDURE — 52648 LASER SURGERY OF PROSTATE: CPT

## 2020-07-27 PROCEDURE — C1889: CPT

## 2020-07-27 RX ORDER — OMEGA-3 ACID ETHYL ESTERS 1 G
1 CAPSULE ORAL
Qty: 0 | Refills: 0 | DISCHARGE

## 2020-07-27 RX ORDER — CEFTRIAXONE 500 MG/1
1000 INJECTION, POWDER, FOR SOLUTION INTRAMUSCULAR; INTRAVENOUS ONCE
Refills: 0 | Status: COMPLETED | OUTPATIENT
Start: 2020-07-27 | End: 2020-07-27

## 2020-07-27 RX ORDER — ATORVASTATIN CALCIUM 80 MG/1
1 TABLET, FILM COATED ORAL
Qty: 0 | Refills: 0 | DISCHARGE

## 2020-07-27 RX ORDER — CIPROFLOXACIN LACTATE 400MG/40ML
1 VIAL (ML) INTRAVENOUS
Qty: 10 | Refills: 0
Start: 2020-07-27 | End: 2020-07-31

## 2020-07-27 RX ORDER — HYDROMORPHONE HYDROCHLORIDE 2 MG/ML
0.5 INJECTION INTRAMUSCULAR; INTRAVENOUS; SUBCUTANEOUS
Refills: 0 | Status: DISCONTINUED | OUTPATIENT
Start: 2020-07-27 | End: 2020-07-27

## 2020-07-27 RX ORDER — METFORMIN HYDROCHLORIDE 850 MG/1
1 TABLET ORAL
Qty: 0 | Refills: 0 | DISCHARGE

## 2020-07-27 RX ORDER — OXYCODONE HYDROCHLORIDE 5 MG/1
5 TABLET ORAL ONCE
Refills: 0 | Status: DISCONTINUED | OUTPATIENT
Start: 2020-07-27 | End: 2020-07-27

## 2020-07-27 RX ORDER — SODIUM CHLORIDE 9 MG/ML
1000 INJECTION, SOLUTION INTRAVENOUS
Refills: 0 | Status: DISCONTINUED | OUTPATIENT
Start: 2020-07-27 | End: 2020-07-27

## 2020-07-27 RX ORDER — FINASTERIDE 5 MG/1
1 TABLET, FILM COATED ORAL
Qty: 0 | Refills: 0 | DISCHARGE

## 2020-07-27 RX ORDER — PHENAZOPYRIDINE HCL 100 MG
1 TABLET ORAL
Qty: 6 | Refills: 0
Start: 2020-07-27 | End: 2020-07-28

## 2020-07-27 RX ORDER — METOPROLOL TARTRATE 50 MG
1 TABLET ORAL
Qty: 0 | Refills: 0 | DISCHARGE

## 2020-07-27 RX ORDER — ONDANSETRON 8 MG/1
4 TABLET, FILM COATED ORAL ONCE
Refills: 0 | Status: DISCONTINUED | OUTPATIENT
Start: 2020-07-27 | End: 2020-07-27

## 2020-07-27 RX ORDER — ASPIRIN/CALCIUM CARB/MAGNESIUM 324 MG
1 TABLET ORAL
Qty: 0 | Refills: 0 | DISCHARGE

## 2020-07-27 RX ORDER — SODIUM CHLORIDE 9 MG/ML
1000 INJECTION INTRAMUSCULAR; INTRAVENOUS; SUBCUTANEOUS
Refills: 0 | Status: DISCONTINUED | OUTPATIENT
Start: 2020-07-27 | End: 2020-07-27

## 2020-07-27 RX ORDER — CHOLECALCIFEROL (VITAMIN D3) 125 MCG
1 CAPSULE ORAL
Qty: 0 | Refills: 0 | DISCHARGE

## 2020-07-27 RX ORDER — TAMSULOSIN HYDROCHLORIDE 0.4 MG/1
1 CAPSULE ORAL
Qty: 0 | Refills: 0 | DISCHARGE

## 2020-07-27 RX ORDER — AMIKACIN SULFATE 250 MG/ML
500 INJECTION, SOLUTION INTRAMUSCULAR; INTRAVENOUS ONCE
Refills: 0 | Status: COMPLETED | OUTPATIENT
Start: 2020-07-27 | End: 2020-07-27

## 2020-07-27 RX ADMIN — SODIUM CHLORIDE 75 MILLILITER(S): 9 INJECTION, SOLUTION INTRAVENOUS at 13:09

## 2020-07-27 RX ADMIN — SODIUM CHLORIDE 60 MILLILITER(S): 9 INJECTION INTRAMUSCULAR; INTRAVENOUS; SUBCUTANEOUS at 09:30

## 2020-07-27 NOTE — ASU PREOP CHECKLIST - ALLERGIES REVIEWED
Assessment/Plan:  Rotator cuff arthropathy of right shoulder  I believe his right shoulder symptoms are on the basis of degenerative rotator cuff arthropathy on the right  He is going to use ice and rest   We are going to give him a prednisone taper  We did give him a small supply of Vicodin to be used as needed, especially at night  We did give him warnings as to sedation, operating machinery X Remigio Jamey  He is in agreement with above  He is going to call with report of his condition next week or sooner as needed  He agrees  Calf swelling  Patient has some left lower extremity edema  Etiology is unclear  I do not believe that he has DVT based on history and examination but I do feel be prudent to get a noninvasive study and we asked him to do this and gave him request for same  This may be related to his acute knee complaints  He did see Orthopedic surgery  Synovial analysis was nonspecific  No evidence of gout or pseudogout  No evidence of infectious process or Lyme  Prednisone taper worked before and we are going to have him try this again for now  Again he is given some limited Vicodin  I believe he will require follow-up with Orthopedic surgery but he is going to call with report next week and will follow up on results of his ultrasound are available  He and wife were in agreement  Diagnoses and all orders for this visit:    Rotator cuff arthropathy of right shoulder  -     predniSONE 10 mg tablet; Take 1 tablet (10 mg total) by mouth daily 4 daily for 2 days then 3 daily for 2 days then 2 daily for 2 days then 1 daily for 4 days  -     HYDROcodone-acetaminophen (NORCO) 5-325 mg per tablet; Take 1 tablet by mouth every 6 (six) hours as needed for painMax Daily Amount: 4 tablets    Calf swelling  -     VAS lower limb venous duplex study, unilateral/limited; Future          Subjective:   Chief Complaint   Patient presents with    Shoulder Pain     R shoulder with decreased rom       2 weeks of worsening R shoulder pain  Was intermittent by history now consistent for 2 weeks  Worst ever  No injury  Feels like he has been using UE more due to L knee trouble and lifting / pulling  L LE swollen  Had knee injection  No posterior calf pain  Began Monday am  Had injection 2 weeks ago  Patient ID: Carli Santiago  is a 56 y o  male  HPI  Patient is a 58-year-old male who presents today with complaint of acute right shoulder pain  He has had this in the past but over the past 2 weeks has been worsening to the point where he has diminished range of motion, difficulty sleeping and difficulty with activities of daily living  His wife had to put his shirt on for him this morning  He states the worst ever  He has had no fall  He does feel like he has been using this upper extremity more as he has been having a lot of left knee trouble and he feels he has been weight-bearing at times using his upper extremities  He also states his left knee continues to be swollen and exquisitely painful  He did see Orthopedic surgery and had a steroid injection but received no relief from this injection  He does have swelling of his left lower extremity which began Monday  He did have an injection in this he approximately two weeks ago  He has no calf pain  He has no chest pain or shortness of breath  No fevers or chills  No erythema X cetera  The following portions of the patient's history were reviewed and updated as appropriate: allergies, current medications, past medical history, past social history, past surgical history and problem list     Review of Systems   Constitution: Negative  Cardiovascular: Negative  Respiratory: Negative  Endocrine: Negative  Hematologic/Lymphatic: Negative  Skin: Negative for rash  Musculoskeletal: Positive for joint pain, joint swelling, muscle weakness and stiffness  Negative for falls  Gastrointestinal: Negative  Genitourinary: Negative  Neurological: Negative  Psychiatric/Behavioral: Negative  Objective:    Physical Exam   Constitutional: He appears well-developed and well-nourished  No distress  Overweight and in no distress   Neck: No JVD present  Cardiovascular: Normal rate, regular rhythm and normal heart sounds  Pulmonary/Chest: Effort normal and breath sounds normal  No respiratory distress  He has no wheezes  He has no rales  Musculoskeletal: He exhibits tenderness  He exhibits no edema or deformity  He has significant tenderness of his right shoulder  He has diminished flexion, internal rotation as well as abduction  Left knee remains swollen with synovial thickening and some degree of effusion  He cannot extend it fully with at least a 10 degree loss of extension  He does have some edema of the left lower extremity  He has no calf tenderness and negative Homans sign  Calf diameter maximum on the right is 18 cm and on the left 18 25   Lymphadenopathy:     He has no cervical adenopathy  Skin: No rash noted  No erythema  Psychiatric:   Somewhat dysphoric affect   Nursing note and vitals reviewed  done

## 2020-07-27 NOTE — ASU DISCHARGE PLAN (ADULT/PEDIATRIC) - CARE PROVIDER_API CALL
Kevin Kevin  UROLOGY  [address] [place] [address]  Phone: [phone]  Fax: [phone]  Follow Up Time:

## 2021-12-03 NOTE — PHYSICAL EXAM
[facility]    Name: Bety Garcia MRN: [id number] YOB: 1958   Date of Surgery: ___1/27/2022__________________    HISTORY AND PHYSICAL  HISTORY   HPI (Indications/symptoms):            Past Hx (Comorbidities):         Family History:       Current Outpatient Medications   Medication Sig   • baclofen (LIORESAL) 10 MG tablet Take 1 tablet by mouth 3 times daily as needed (muscle spasm).   • levothyroxine 125 MCG tablet Take 1 tablet by mouth daily.   • atorvastatin (LIPITOR) 20 MG tablet Take 1 tablet by mouth daily.   • metoPROLOL succinate (TOPROL-XL) 50 MG 24 hr tablet Take 1 tablet by mouth daily.   • aspirin (ASPIRIN CHILDRENS) 81 MG chewable tablet Chew 1 tablet by mouth daily.   • meclizine (ANTIVERT) 25 MG tablet Take 1 tablet by mouth 3 times daily as needed for Dizziness.   • nitroGLYcerin (NITROSTAT) 0.4 MG sublingual tablet Take 0.4 mg by mouth.   • albuterol (VENTOLIN HFA) 108 (90 Base) MCG/ACT inhaler as directed.   • acetaminophen (TYLENOL) 650 MG CR tablet as needed.       Allergies:   ALLERGIES:  Sulfa antibiotics       PHYSICAL EXAMINATION:    Temp: Pulse: Resp: B/P: Weight: N/A:   Mental Status:   Skin:   Eyes:   ENT:   Neck:   Heart:   Lungs:   Abdomen:   Extremities:   Other:   Pertinent labs:   X-ray:   ASA PHYSICAL STATUS:   All pre-operative evaluations must include a physical status number according to the following guidelines.   Please check one:  o ASA1: Generally healthy patient free of systemic disease. Patient may have localized disease.  o ASA2: Patient with mild systemic disease.  o ASA3: Patient with severe systemic disease that is not incapacitating or immediately life threatening.  o ASA4: Patient with severe systemic disease that is life threatening.  o    ASA5: Moribund patient who may not survive with or without the procedure.   Pre-op diagnosis:   Proposed surgery:       Physician Signature: ___________________________________________  Dictation #:         Kabir Julka, MD    Date: _________________________________                   Time: _____________________________________                                                        [General Appearance - Well Developed] : well developed [Well Groomed] : well groomed [Normal Appearance] : normal appearance [General Appearance - Well Nourished] : well nourished [General Appearance - In No Acute Distress] : no acute distress [No Deformities] : no deformities [Normal Conjunctiva] : the conjunctiva exhibited no abnormalities [Normal Oral Mucosa] : normal oral mucosa [No Oral Pallor] : no oral pallor [Eyelids - No Xanthelasma] : the eyelids demonstrated no xanthelasmas [Normal Jugular Venous A Waves Present] : normal jugular venous A waves present [No Oral Cyanosis] : no oral cyanosis [Normal Jugular Venous V Waves Present] : normal jugular venous V waves present [No Jugular Venous Lo A Waves] : no jugular venous lo A waves [Heart Rate And Rhythm] : heart rate and rhythm were normal [Heart Sounds] : normal S1 and S2 [Murmurs] : no murmurs present [Exaggerated Use Of Accessory Muscles For Inspiration] : no accessory muscle use [Auscultation Breath Sounds / Voice Sounds] : lungs were clear to auscultation bilaterally [Respiration, Rhythm And Depth] : normal respiratory rhythm and effort [Abdomen Soft] : soft [Abdomen Tenderness] : non-tender [Abdomen Mass (___ Cm)] : no abdominal mass palpated [Abnormal Walk] : normal gait [Cyanosis, Localized] : no localized cyanosis [Gait - Sufficient For Exercise Testing] : the gait was sufficient for exercise testing [Nail Clubbing] : no clubbing of the fingernails [Skin Color & Pigmentation] : normal skin color and pigmentation [Petechial Hemorrhages (___cm)] : no petechial hemorrhages [] : no rash [No Venous Stasis] : no venous stasis [No Skin Ulcers] : no skin ulcer [Skin Lesions] : no skin lesions [Affect] : the affect was normal [Oriented To Time, Place, And Person] : oriented to person, place, and time [No Xanthoma] : no  xanthoma was observed [Mood] : the mood was normal [No Anxiety] : not feeling anxious

## 2022-01-03 NOTE — ED PROVIDER NOTE - HISTORY ATTESTATION, MLM
I have reviewed and confirmed nurses' notes... Albendazole Pregnancy And Lactation Text: This medication is Pregnancy Category C and it isn't known if it is safe during pregnancy. It is also excreted in breast milk.

## 2022-12-12 NOTE — DISCHARGE NOTE NURSING/CASE MANAGEMENT/SOCIAL WORK - NSDCPETBCESMAN_GEN_ALL_CORE
If you are a smoker, it is important for your health to stop smoking. Please be aware that second hand smoke is also harmful. Ndc (200 Mg Prefilled Syringe): [address]-70 Ndc (200 Mg Prefilled Syringe): [address]-58

## 2023-02-21 ENCOUNTER — APPOINTMENT (OUTPATIENT)
Dept: NEUROLOGY | Facility: CLINIC | Age: 73
End: 2023-02-21

## 2023-03-30 NOTE — ED ADULT NURSE NOTE - HOW OFTEN DO YOU HAVE SIX OR MORE DRINKS ON ONE OCCASION?
Reviewed labs and scans. Discussed Getting a follow up CT Scan. Order placed. Plan to be placed for Nivolumab and will need chemo teaching  Discussed need for port for treatment  Referral for SW placed. Referral for surgery placed for a port placement. Return to see MD in 2 weeks to start treatment. Would like a Friday start date.
Never

## 2023-04-10 NOTE — ED ADULT NURSE NOTE - CAS DISCH CONDITION
SPEECH THERAPY DISCHARGE VISIT    SUBJECTIVE:  PT. IS READY FOR ST D/C VISIT AND STATED NO CHANGES OR COMPLAINTS.  YAN LOWE CG IS PRESENT.    PAIN:  0/10    EDEMA:  NONE    WOUND / SKIN CONDITION:  INTACT    TODAY'S INTERVENTIONS / EDUCATION / PATIENT'S RESPONSE / GOAL STATUS:  VOICE/BREATH SUPPORT TREATMENT TASKS; REVIEW TRAINING/INSTRUCTION HEP; REVIEW GOALS AND PROGRESS; D/C WITH GOALS MET AND PT. IS AN (I) VERBAL COMMUNICATOR.      EXPLANATION OF UNMET GOALS:  N/A    DISCHARGE STATUS     TO SELF  TO FAMILY     DISCHARGE CONDITION   GOOD    DISCHARGE REASON    GOALS MET       INSTRUCTIONS HOME PROGRAM PROVIDED TO ÓSCAR WASHINGTON          CONTENT: VOICE/BREATH SUPPORT HEP          PATIENT CAREGIVER LEVEL OF COMPLIANCE:  GOOD    UNMET NEEDS:  NONE    SERVICES CONTINUING:  SN    COMMUNICATION / CARE COORDINATION:  D/C SUMMARY TO MD/TEAM      PATIENT AND CAREGIVERS ARE AGREEABLE WITH DISCHARGE PLAN:  YES
Stable

## 2023-05-30 NOTE — PROCEDURE NOTE - NSPROCDETAILS_GEN_ALL_CORE
CM met with PT for PT check in  Reviewed discharge plan for tomorrow along with follow up care and supports  PT in agreement with all  PT declined cm, partial  PT declined pcp appt but agreeable to d/c notification and discharge summary be sent  PT denies si/hi/ah/vh anxiety and depression  PT expressed gratitude, reassurance provided  Reviewed that update provided to PT wife and that she will be picking him up at 11am  PT in agreement  CM placed call to PT PCP Kari Sales primary care [phone]  Left message informing of PT scheduled discharge, requested return call with any questions  sterile technique, indwelling urinary device inserted

## 2023-06-07 ENCOUNTER — EMERGENCY (EMERGENCY)
Facility: HOSPITAL | Age: 73
LOS: 1 days | Discharge: HOME | End: 2023-06-07
Attending: EMERGENCY MEDICINE
Payer: MEDICARE

## 2023-06-07 VITALS
DIASTOLIC BLOOD PRESSURE: 75 MMHG | RESPIRATION RATE: 16 BRPM | OXYGEN SATURATION: 97 % | SYSTOLIC BLOOD PRESSURE: 164 MMHG | HEART RATE: 72 BPM | TEMPERATURE: 99 F

## 2023-06-07 VITALS
TEMPERATURE: 98 F | RESPIRATION RATE: 18 BRPM | DIASTOLIC BLOOD PRESSURE: 93 MMHG | WEIGHT: 154.98 LBS | HEIGHT: 62 IN | SYSTOLIC BLOOD PRESSURE: 172 MMHG | HEART RATE: 73 BPM | OXYGEN SATURATION: 96 %

## 2023-06-07 DIAGNOSIS — Z98.890 OTHER SPECIFIED POSTPROCEDURAL STATES: Chronic | ICD-10-CM

## 2023-06-07 LAB
ALBUMIN SERPL ELPH-MCNC: 4.2 G/DL — SIGNIFICANT CHANGE UP (ref 3.3–5)
ALP SERPL-CCNC: 85 U/L — SIGNIFICANT CHANGE UP (ref 40–120)
ALT FLD-CCNC: 12 U/L — SIGNIFICANT CHANGE UP (ref 10–45)
ANION GAP SERPL CALC-SCNC: 16 MMOL/L — SIGNIFICANT CHANGE UP (ref 5–17)
APPEARANCE UR: ABNORMAL
APTT BLD: 34.6 SEC — SIGNIFICANT CHANGE UP (ref 27.5–35.5)
AST SERPL-CCNC: 20 U/L — SIGNIFICANT CHANGE UP (ref 10–40)
BACTERIA # UR AUTO: ABNORMAL
BASOPHILS # BLD AUTO: 0.06 K/UL — SIGNIFICANT CHANGE UP (ref 0–0.2)
BASOPHILS NFR BLD AUTO: 0.9 % — SIGNIFICANT CHANGE UP (ref 0–2)
BILIRUB SERPL-MCNC: 0.6 MG/DL — SIGNIFICANT CHANGE UP (ref 0.2–1.2)
BILIRUB UR-MCNC: NEGATIVE — SIGNIFICANT CHANGE UP
BUN SERPL-MCNC: 15 MG/DL — SIGNIFICANT CHANGE UP (ref 7–23)
CALCIUM SERPL-MCNC: 10.4 MG/DL — SIGNIFICANT CHANGE UP (ref 8.4–10.5)
CHLORIDE SERPL-SCNC: 107 MMOL/L — SIGNIFICANT CHANGE UP (ref 96–108)
CO2 SERPL-SCNC: 19 MMOL/L — LOW (ref 22–31)
COLOR SPEC: SIGNIFICANT CHANGE UP
CREAT SERPL-MCNC: 1.24 MG/DL — SIGNIFICANT CHANGE UP (ref 0.5–1.3)
DIFF PNL FLD: ABNORMAL
EGFR: 61 ML/MIN/1.73M2 — SIGNIFICANT CHANGE UP
EOSINOPHIL # BLD AUTO: 0.09 K/UL — SIGNIFICANT CHANGE UP (ref 0–0.5)
EOSINOPHIL NFR BLD AUTO: 1.3 % — SIGNIFICANT CHANGE UP (ref 0–6)
EPI CELLS # UR: 0 /HPF — SIGNIFICANT CHANGE UP
GLUCOSE SERPL-MCNC: 148 MG/DL — HIGH (ref 70–99)
GLUCOSE UR QL: ABNORMAL
HCT VFR BLD CALC: 43 % — SIGNIFICANT CHANGE UP (ref 39–50)
HGB BLD-MCNC: 14.6 G/DL — SIGNIFICANT CHANGE UP (ref 13–17)
IMM GRANULOCYTES NFR BLD AUTO: 0.3 % — SIGNIFICANT CHANGE UP (ref 0–0.9)
INR BLD: 1.14 RATIO — SIGNIFICANT CHANGE UP (ref 0.88–1.16)
KETONES UR-MCNC: SIGNIFICANT CHANGE UP
LEUKOCYTE ESTERASE UR-ACNC: NEGATIVE — SIGNIFICANT CHANGE UP
LYMPHOCYTES # BLD AUTO: 1.27 K/UL — SIGNIFICANT CHANGE UP (ref 1–3.3)
LYMPHOCYTES # BLD AUTO: 18.1 % — SIGNIFICANT CHANGE UP (ref 13–44)
MCHC RBC-ENTMCNC: 30.2 PG — SIGNIFICANT CHANGE UP (ref 27–34)
MCHC RBC-ENTMCNC: 34 GM/DL — SIGNIFICANT CHANGE UP (ref 32–36)
MCV RBC AUTO: 88.8 FL — SIGNIFICANT CHANGE UP (ref 80–100)
MONOCYTES # BLD AUTO: 0.62 K/UL — SIGNIFICANT CHANGE UP (ref 0–0.9)
MONOCYTES NFR BLD AUTO: 8.8 % — SIGNIFICANT CHANGE UP (ref 2–14)
NEUTROPHILS # BLD AUTO: 4.97 K/UL — SIGNIFICANT CHANGE UP (ref 1.8–7.4)
NEUTROPHILS NFR BLD AUTO: 70.6 % — SIGNIFICANT CHANGE UP (ref 43–77)
NITRITE UR-MCNC: NEGATIVE — SIGNIFICANT CHANGE UP
NRBC # BLD: 0 /100 WBCS — SIGNIFICANT CHANGE UP (ref 0–0)
PH UR: 6.5 — SIGNIFICANT CHANGE UP (ref 5–8)
PLATELET # BLD AUTO: 187 K/UL — SIGNIFICANT CHANGE UP (ref 150–400)
POTASSIUM SERPL-MCNC: 4.3 MMOL/L — SIGNIFICANT CHANGE UP (ref 3.5–5.3)
POTASSIUM SERPL-SCNC: 4.3 MMOL/L — SIGNIFICANT CHANGE UP (ref 3.5–5.3)
PROT SERPL-MCNC: 6.8 G/DL — SIGNIFICANT CHANGE UP (ref 6–8.3)
PROT UR-MCNC: >600
PROTHROM AB SERPL-ACNC: 13.3 SEC — SIGNIFICANT CHANGE UP (ref 10.5–13.4)
RBC # BLD: 4.84 M/UL — SIGNIFICANT CHANGE UP (ref 4.2–5.8)
RBC # FLD: 13.4 % — SIGNIFICANT CHANGE UP (ref 10.3–14.5)
RBC CASTS # UR COMP ASSIST: >50 /HPF — SIGNIFICANT CHANGE UP (ref 0–4)
SODIUM SERPL-SCNC: 142 MMOL/L — SIGNIFICANT CHANGE UP (ref 135–145)
SP GR SPEC: 1.02 — SIGNIFICANT CHANGE UP (ref 1.01–1.02)
UROBILINOGEN FLD QL: NEGATIVE — SIGNIFICANT CHANGE UP
WBC # BLD: 7.03 K/UL — SIGNIFICANT CHANGE UP (ref 3.8–10.5)
WBC # FLD AUTO: 7.03 K/UL — SIGNIFICANT CHANGE UP (ref 3.8–10.5)
WBC UR QL: 1 /HPF — SIGNIFICANT CHANGE UP (ref 0–5)

## 2023-06-07 PROCEDURE — 99284 EMERGENCY DEPT VISIT MOD MDM: CPT | Mod: FS

## 2023-06-07 PROCEDURE — 81001 URINALYSIS AUTO W/SCOPE: CPT

## 2023-06-07 PROCEDURE — 85025 COMPLETE CBC W/AUTO DIFF WBC: CPT

## 2023-06-07 PROCEDURE — 99284 EMERGENCY DEPT VISIT MOD MDM: CPT | Mod: 25

## 2023-06-07 PROCEDURE — 85730 THROMBOPLASTIN TIME PARTIAL: CPT

## 2023-06-07 PROCEDURE — 87086 URINE CULTURE/COLONY COUNT: CPT

## 2023-06-07 PROCEDURE — 76770 US EXAM ABDO BACK WALL COMP: CPT

## 2023-06-07 PROCEDURE — 85610 PROTHROMBIN TIME: CPT

## 2023-06-07 PROCEDURE — 76770 US EXAM ABDO BACK WALL COMP: CPT | Mod: 26

## 2023-06-07 PROCEDURE — 99283 EMERGENCY DEPT VISIT LOW MDM: CPT

## 2023-06-07 PROCEDURE — 80053 COMPREHEN METABOLIC PANEL: CPT

## 2023-06-07 RX ORDER — SODIUM CHLORIDE 9 MG/ML
1000 INJECTION INTRAMUSCULAR; INTRAVENOUS; SUBCUTANEOUS ONCE
Refills: 0 | Status: COMPLETED | OUTPATIENT
Start: 2023-06-07 | End: 2023-06-07

## 2023-06-07 RX ADMIN — SODIUM CHLORIDE 1000 MILLILITER(S): 9 INJECTION INTRAMUSCULAR; INTRAVENOUS; SUBCUTANEOUS at 18:08

## 2023-06-07 NOTE — ED PROVIDER NOTE - NS ED ROS FT
Constitutional: No fever or chills  Eyes: No visual changes, eye pain or redness  HEENT: No throat pain, ear pain, nasal pain. No nose bleeding.  CV: No chest pain or lower extremity edema  Resp: No SOB no cough  GI: No abd pain. No nausea or vomiting. No diarrhea. No constipation.   : No dysuria, +++hematuria.   MSK: No musculoskeletal pain  Skin: No rash  Neuro: No headache. No numbness or tingling. No weakness.

## 2023-06-07 NOTE — ED ADULT NURSE REASSESSMENT NOTE - NS ED NURSE REASSESS COMMENT FT1
Per MD Sales, pt son arranged uber for pt. pt placed in waiting room for pickup. Triage and security notified pt awaiting  from uber

## 2023-06-07 NOTE — ED PROVIDER NOTE - PHYSICAL EXAMINATION
Gen: AAO x 3, NAD  Skin: No rashes or lesions  HEENT: NC/AT, PERRLA, EOMI, MMM  Resp: unlabored CTAB  Cardiac: rrr s1s2, no murmurs, rubs or gallops  GI: ND, +BS, Soft, NT  Ext: no pedal edema, FROM in all extremities  Neuro: no focal deficits Gen: AAO x 3, NAD  Skin: No rashes or lesions  HEENT: NC/AT, PERRLA, EOMI, MMM  Resp: unlabored CTAB  Cardiac: rrr s1s2, no murmurs, rubs or gallops  GI: ND, +BS, Soft, NT  Ext: no pedal edema, FROM in all extremities  Neuro: no focal deficits  Attending Dionna Farooq: Gen: NAD, heent: atrauamtic, eomi, perrla,  neck; nttp, no nuchal rigidity, chest: nttp, no crepitus, cv: rrr, no murmurs, lungs: ctab, abd: soft, nontender, nondistended, no peritoneal signs, , no guarding, ext: wwp, neg homans, skin: no rash, neuro: awake and alert, following commands, speech clear, sensation and strength intact, no focal deficits

## 2023-06-07 NOTE — ED PROVIDER NOTE - NS ED ATTENDING STATEMENT MOD
I have seen and examined this patient and fully participated in the care of this patient as the teaching attending.  The service was shared with the HELADIO.  I reviewed and verified the documentation and independently performed the documented:

## 2023-06-07 NOTE — CONSULT NOTE ADULT - SUBJECTIVE AND OBJECTIVE BOX
HPI:  73y Male with PMHx of HTN, HLD, dementia on Aricept and ASA, BPH s/p greenlight laser in  with Dr. Kevin, presents with gross hematuria since last night. Admits to passing some small clots. Reports he is otherwise voiding well, feels he's emptying bladder. No history of hematuria in the past. He denies strenuous activity, constipation, fever, nausea, vomiting, dysuria, urgency, frequency, abdominal or flank pain. Random bladder scan 150cc.     PAST MEDICAL & SURGICAL HISTORY:  CAD (coronary artery disease)      DM (diabetes mellitus), type 2      Dyslipidemia      HTN (hypertension)      Obesity      History of BPH      Enlarged prostate with lower urinary tract symptoms (LUTS)      Drug-induced retention of urine      CAD (coronary artery disease)      Stented coronary artery  2010 with Dr. Brayan Thao [facility]    H/O prostate biopsy  Dec 2019    FAMILY HISTORY:  FH: stroke  father    FH: depression  sister    FH: heart disease  Father    No known  malignancy     Social History:  Denies alcohol and drug abuse, nonsmoker     MEDICATIONS  (STANDING):    MEDICATIONS  (PRN):    Allergies    No Known Allergies    Intolerances      REVIEW OF SYSTEMS: Pertinent positives and negatives as stated in HPI, otherwise negative    Vital signs  T(C): 37 (23 @ 19:18), Max: 37 (23 @ 19:18)  HR: 72 (23 @ 19:18)  BP: 164/75 (23 @ 19:18)  SpO2: 97% (23 @ 19:18)  Wt(kg): --    Physical Exam  Gen: NAD  HEENT: normocephalic, atraumatic, no scleral icterus  Pulm: CTA b/l, No respiratory distress, no subcostal retractions, no accessory muscle use   CV: S1 S2, RRR,  no JVD  Abd: Soft, NT, ND, no rebound tenderness or guarding  : voiding red urine  MSK:  No CVAT, Moving all extremities, full ROM in all extremities, No edema present  NEURO: A&Ox3, no focal neurological deficits, CN 2-12 grossly intact  SKIN: warm, dry, no rash.    LABS:         @ 14:26    WBC 7.03  / Hct 43.0  / SCr 1.24         142  |  107  |  15  ----------------------------<  148<H>  4.3   |  19<L>  |  1.24    Ca    10.4      2023 14:26    TPro  6.8  /  Alb  4.2  /  TBili  0.6  /  DBili  x   /  AST  20  /  ALT  12  /  AlkPhos  85  06-07    PT/INR - ( 2023 14:26 )   PT: 13.3 sec;   INR: 1.14 ratio         PTT - ( 2023 14:26 )  PTT:34.6 sec  Urinalysis Basic - ( 2023 14:26 )    Color: DARK BROWN / Appearance: Slightly Turbid / S.024 / pH: x  Gluc: x / Ketone: Trace  / Bili: Negative / Urobili: Negative   Blood: x / Protein: >600 / Nitrite: Negative   Leuk Esterase: Negative / RBC: >50 /hpf / WBC 1 /HPF   Sq Epi: x / Non Sq Epi: x / Bacteria: Occasional     Radiology: u< from: POCUS ED Kidney and Bladder (23 @ 15:32) >  IMPRESSION:  No hydronephrosis present in the left and right   kidneys.Bladder with evidence of mixed echogenicities, in the setting of   hematuria this could represent clot      < end of copied text >

## 2023-06-07 NOTE — ED ADULT NURSE NOTE - NSICDXPASTSURGICALHX_GEN_ALL_CORE_FT
PAST SURGICAL HISTORY:  H/O prostate biopsy Dec 2019    Stented coronary artery 5/26/2010 with Dr. Brayan Thao [facility]

## 2023-06-07 NOTE — ED PROVIDER NOTE - OBJECTIVE STATEMENT
73yom pmhx of HTN HLD Dementia on Aricept and ASA hx of BPH s/p greenlight laser in 2021 by Dr. Kevin here with brother for hematuria since last night. pt reports bright red blood and passing some clots and urgency. No fever or chills. no nausea or vomiting. no abd pain or back pain, 73yom pmhx of HTN HLD Dementia on Aricept and ASA hx of BPH s/p greenlight laser in 2021 by Dr. Kevin here with brother for hematuria since last night. pt reports bright red blood and passing some clots and urgency. No fever or chills. no nausea or vomiting. no abd pain or back pain,  Attending Dionna Farooq: 74 yo male h/o dementia presenting with concern for hematuria. pt has been feeling well. went ot the bathroom and noticed BRB when urinating. no h/o similar in the past. is able to urinate but still having blood. h/o greenlight procedure to prostate in theh past.no h/o ureteral stones.

## 2023-06-07 NOTE — ED PROVIDER NOTE - CLINICAL SUMMARY MEDICAL DECISION MAKING FREE TEXT BOX
Attending Dionna Farooq: 72 yo male presenting with hematjuria. pocus performed showing evidence of area of complex mixed echogeniciteis within the bladder, concenring for clot, without evidence of retention. pt not on ablood thinners. will monitor urine output as pt may require CBI. no abdominal pain or ttp. no pulsatile mass making AAA less liekly. pt hemodynamicallys table. will check labs, UA and re-eeval. consider Uro eval

## 2023-06-07 NOTE — ED PROVIDER NOTE - DISCHARGE DATE
Patient alert and oriented x4 . Vital signs stable , denies any chest pain or short of breath . Up with min assist ,patient is sitting in the chair most of the time ,states bed is not comfortable . Right arm sling and post mold in place . Safety precautions in place . Reminded to \"call don't fall\".  Plan o go to [facility] pending insurance approval. 07-Jun-2023

## 2023-06-07 NOTE — ED ADULT NURSE NOTE - OBJECTIVE STATEMENT
Male 73 years old with past medical history of DM, CAD and HTN, alert and orientedx4 came in for hematuria. Report she has blood in urine for 3 days. Denies abdominal pain, dysuria, fever or chills. No chest pain, sob, N/V. Will monitor.

## 2023-06-07 NOTE — ED PROVIDER NOTE - NSICDXPASTSURGICALHX_GEN_ALL_CORE_FT
PAST SURGICAL HISTORY:  H/O prostate biopsy Dec 2019    Stented coronary artery 5/26/2010 with Dr. Brayan Thao [facility]

## 2023-06-07 NOTE — ED PROVIDER NOTE - PATIENT PORTAL LINK FT
You can access the FollowMyHealth Patient Portal offered by [facility] by registering at the following website: [email or web address]. By joining [employer]’s FollowMyHealth portal, you will also be able to view your health information using other applications (apps) compatible with our system.

## 2023-06-07 NOTE — ED ADULT NURSE NOTE - NSFALLUNIVINTERV_ED_ALL_ED
Bed/Stretcher in lowest position, wheels locked, appropriate side rails in place/Call bell, personal items and telephone in reach/Instruct patient to call for assistance before getting out of bed/chair/stretcher/Non-slip footwear applied when patient is off stretcher/[place] to call system/Physically safe environment - no spills, clutter or unnecessary equipment/Purposeful proactive rounding/Room/bathroom lighting operational, light cord in reach

## 2023-06-07 NOTE — ED PROVIDER NOTE - PROGRESS NOTE DETAILS
Patient signed out to me by the prior attending.  The patient's disposition was discussed with the treating team and agreed upon.  Kevin Austin M.D. (attending) Patient followed with uro JOSSIE Soto and states urine cleared and we can discharge pt seen and evaluated by Uro PA and wants the patient to receive IV hydration and check the color of the urine. Improving urine color after iv hydration. pt brother left the ER and will send an uber for the patient when patient is ready for discharge. pt understands and plan of care and discharge instructions explained to brother. strict return precautions advised. case discussed with Dr. Vargas.

## 2023-06-07 NOTE — ED PROVIDER NOTE - NSFOLLOWUPINSTRUCTIONS_ED_ALL_ED_FT
Follow up with your PMD within 48-72 hrs. Show copies of your reports given to you. Take all of your medications as previously prescribed.   PLEASE CALL DR. KEVIN TOMORROW FOR A FOLLOW UP APPT. PLEASE DRINK MORE WATER UNTIL YOUR URINE IS TURNING LIGHT YELLOw.   Return to the ER for persistent or worsening blood in the urine, abd pain, back pain, fever or chills any other concerns.    Kevin Kevin  Urology  [address] [place] [address], [address]  [place] [address]  Phone: [phone]

## 2023-06-07 NOTE — CONSULT NOTE ADULT - ASSESSMENT
72y/o Male with PMHx of HTN, HLD, dementia on Aricept and ASA, BPH s/p greenlight laser in 2021 with Dr. Kevin, now presents with gross hematuria    -Pt monitored after IV hydration, urine color improved to clear watermelon color   -Advised to continue to self hydrate  -Follow up urine culture  -Initiate bowel regimen to prevent constipation   -No cho indicated at this time   -Return to ED precautions given   -Discussed with Dr. Kevin who will see pt next week

## 2023-06-07 NOTE — ED PROVIDER NOTE - CARE PROVIDER_API CALL
Kevin Kevin  Urology  [address], [address]  [place] [address]  Phone: [phone]  Fax: [phone]  Follow Up Time:

## 2023-06-08 LAB
CULTURE RESULTS: SIGNIFICANT CHANGE UP
SPECIMEN SOURCE: SIGNIFICANT CHANGE UP

## 2023-06-16 ENCOUNTER — APPOINTMENT (OUTPATIENT)
Dept: UROLOGY | Facility: CLINIC | Age: 73
End: 2023-06-16
Payer: MEDICARE

## 2023-06-16 PROCEDURE — 99204 OFFICE O/P NEW MOD 45 MIN: CPT

## 2023-06-16 NOTE — HISTORY OF PRESENT ILLNESS
[FreeTextEntry1] : here with brother- Kaya here from [place]\par hx of hematuria and small clots - one- 2 days\par no fevr or n/V, reports chronic constipaton but worse \par admits to avg water , no new meds or diet changes\par went to ER and HEIDI and labs and urine done: HEIDI wht cyst ( right) and prosate size of 125 ml and ? clot in bladder , cx negative and creat 1.24\par brother with robotic [facility] in [place]\par had greenlight for prostate 2021 - had cho for one year with flomax - no proscar given\par after surgery OK but then slowed after one year with incomplete emptying\par no strain to void, no hematuria \par first episode of Hematuria \par here for further eval :

## 2023-06-16 NOTE — ASSESSMENT
[FreeTextEntry1] : here with brother- Kaya here from [place]\par hx of hematuria and small clots - one- 2 days\par no fevr or n/V, reports chronic constipaton but worse \par admits to avg water , no new meds or diet changes\par went to ER and HEIDI and labs and urine done: HEIDI wht cyst ( right) and prosate size of 125 ml and ? clot in bladder , cx negative and creat 1.24\par brother with robotic [facility] in [place]\par had greenlight for prostate 2021 - had cho for one year with flomax - no proscar given\par after surgery OK but then slowed after one year with incomplete emptying\par no strain to void, no hematuria \par first episode of Hematuria \par here for further eval :\par 1- check CT urogram \par 2- cysto after \par 3- discussed cho due to elev bladder scan ( has not voided since charly AM) as this may be related to BPH or decreaesd bladder activity \par 4- will recheck pvr since starting FLOMAX ( tolerated before ) when comes for CYSTO \par 5- discussed with Kaya that patient has early dementia and son if POA\par

## 2023-06-16 NOTE — PHYSICAL EXAM
[General Appearance - Well Developed] : well developed [General Appearance - Well Nourished] : well nourished [Normal Appearance] : normal appearance [Well Groomed] : well groomed [General Appearance - In No Acute Distress] : no acute distress [Edema] : no peripheral edema [Respiration, Rhythm And Depth] : normal respiratory rhythm and effort [Exaggerated Use Of Accessory Muscles For Inspiration] : no accessory muscle use [Abdomen Soft] : soft [Abdomen Tenderness] : non-tender [Costovertebral Angle Tenderness] : no ~M costovertebral angle tenderness [FreeTextEntry1] : due to void - one liter [Normal Station and Gait] : the gait and station were normal for the patient's age [] : no rash [No Focal Deficits] : no focal deficits [Oriented To Time, Place, And Person] : oriented to person, place, and time [Affect] : the affect was normal [Mood] : the mood was normal [Not Anxious] : not anxious [No Palpable Adenopathy] : no palpable adenopathy

## 2023-06-20 ENCOUNTER — APPOINTMENT (OUTPATIENT)
Dept: CT IMAGING | Facility: CLINIC | Age: 73
End: 2023-06-20
Payer: MEDICARE

## 2023-06-20 ENCOUNTER — OUTPATIENT (OUTPATIENT)
Dept: OUTPATIENT SERVICES | Facility: HOSPITAL | Age: 73
LOS: 1 days | End: 2023-06-20
Payer: MEDICARE

## 2023-06-20 DIAGNOSIS — Z98.890 OTHER SPECIFIED POSTPROCEDURAL STATES: Chronic | ICD-10-CM

## 2023-06-20 DIAGNOSIS — Z87.438 PERSONAL HISTORY OF OTHER DISEASES OF MALE GENITAL ORGANS: ICD-10-CM

## 2023-06-20 PROCEDURE — 74178 CT ABD&PLV WO CNTR FLWD CNTR: CPT | Mod: 26,MH

## 2023-06-20 PROCEDURE — 74178 CT ABD&PLV WO CNTR FLWD CNTR: CPT

## 2023-06-23 ENCOUNTER — APPOINTMENT (OUTPATIENT)
Dept: UROLOGY | Facility: CLINIC | Age: 73
End: 2023-06-23
Payer: MEDICARE

## 2023-06-23 ENCOUNTER — OUTPATIENT (OUTPATIENT)
Dept: OUTPATIENT SERVICES | Facility: HOSPITAL | Age: 73
LOS: 1 days | End: 2023-06-23
Payer: MEDICARE

## 2023-06-23 DIAGNOSIS — Z98.890 OTHER SPECIFIED POSTPROCEDURAL STATES: Chronic | ICD-10-CM

## 2023-06-23 DIAGNOSIS — Z87.438 PERSONAL HISTORY OF OTHER DISEASES OF MALE GENITAL ORGANS: ICD-10-CM

## 2023-06-23 DIAGNOSIS — Z87.448 PERSONAL HISTORY OF OTHER DISEASES OF URINARY SYSTEM: ICD-10-CM

## 2023-06-23 PROCEDURE — 52000 CYSTOURETHROSCOPY: CPT

## 2023-06-23 RX ORDER — CEPHALEXIN 250 MG/1
250 TABLET ORAL TWICE DAILY
Qty: 10 | Refills: 0 | Status: ACTIVE | COMMUNITY
Start: 2023-06-23 | End: 1900-01-01

## 2023-06-27 DIAGNOSIS — Z87.438 PERSONAL HISTORY OF OTHER DISEASES OF MALE GENITAL ORGANS: ICD-10-CM

## 2023-06-27 DIAGNOSIS — Z87.448 PERSONAL HISTORY OF OTHER DISEASES OF URINARY SYSTEM: ICD-10-CM

## 2023-07-12 ENCOUNTER — NON-APPOINTMENT (OUTPATIENT)
Age: 73
End: 2023-07-12

## 2023-07-20 ENCOUNTER — APPOINTMENT (OUTPATIENT)
Dept: UROLOGY | Facility: CLINIC | Age: 73
End: 2023-07-20

## 2023-08-15 NOTE — ED ADULT TRIAGE NOTE - BSA (M2)
are also exacerbated in response, due to health and ongoing legal issues. Visit spent discussing recent events, lengthy discussion about strategies to better manage increased anxiety. Strengths: pt is motivated to improve functioning     Recommendations to Patient:   1. Demond Alvarado [phone]   2. Self talk : I'm doing everything I need to make this better  3. Relaxed breathing (taught and managed)  4. Meet with PCP on 10/21    Recommendations to PCP:   1. Reinforce recommendations to pt. Follow-up plan: to see in one week    Referrals: None    Javier Nichole, PhD     This note was generated completely or in part utilizing Dragon dictation speech recognition software. Occasionally, words are mistranscribed and despite editing, the text may contain inaccuracies due to incorrect word recognition.   If further clarification is needed please contact the office at [phone]

## 2023-09-07 ENCOUNTER — RX RENEWAL (OUTPATIENT)
Age: 73
End: 2023-09-07

## 2023-09-07 RX ORDER — TAMSULOSIN HYDROCHLORIDE 0.4 MG/1
0.4 CAPSULE ORAL
Qty: 30 | Refills: 6 | Status: ACTIVE | COMMUNITY
Start: 2023-06-16 | End: 1900-01-01

## 2023-09-07 NOTE — ED ADULT NURSE NOTE - CAS ELECT INFOMATION PROVIDED
Refill Request Routed to the [facility] Review Pool for the pharmacist to review.   DC instructions

## 2024-02-13 NOTE — ED PROVIDER NOTE - NS ED ATTENDING STATEMENT MOD
Problem: Skin Integrity Alteration  Goal: Skin remains intact with no new/deterioration of wound or pressure injury  Outcome: Not met, plan adjusted   Patient return for evaluation of new wound. Therapy plan initiated. Educational topics reviewed at this visit included the following:  Elevation strategies/frequency  Restricting dietary intake of high sodium/high sugar foods  Reading food labels, use of salt alternatives/spices  [place] wrap, discard old wrap  Moisturizing skin daily  Complete vascular workup as advised    AVS provided with future wound care appointments as recommended. Follow up in 3 weeks.  
I have personally seen and examined this patient.  I have fully participated in the care of this patient. I have reviewed all pertinent clinical information, including history, physical exam, plan and the Resident’s note and agree except as noted.

## 2024-05-08 ENCOUNTER — RX RENEWAL (OUTPATIENT)
Age: 74
End: 2024-05-08

## 2024-05-31 ENCOUNTER — RX RENEWAL (OUTPATIENT)
Age: 74
End: 2024-05-31

## 2024-07-24 ENCOUNTER — RX RENEWAL (OUTPATIENT)
Age: 74
End: 2024-07-24

## 2024-11-09 NOTE — ED ADULT NURSE REASSESSMENT NOTE - NS ED NURSE REASSESS COMMENT FT1
Urology placed cho catheter at bedside. Successful after one attempt. 600 cc's of clear urine noted to drain. Pt. reports immediate relief. MD made aware. no